# Patient Record
Sex: MALE | Race: WHITE | Employment: OTHER | URBAN - METROPOLITAN AREA
[De-identification: names, ages, dates, MRNs, and addresses within clinical notes are randomized per-mention and may not be internally consistent; named-entity substitution may affect disease eponyms.]

---

## 2017-01-06 PROBLEM — F32.89 DEPRESSIVE DISORDER, NOT ELSEWHERE CLASSIFIED: Status: ACTIVE | Noted: 2017-01-06

## 2017-04-05 ENCOUNTER — HOSPITAL ENCOUNTER (INPATIENT)
Facility: HOSPITAL | Age: 77
LOS: 10 days | Discharge: NURSING, REHAB OR LONG-TERM CARE | DRG: 372 | End: 2017-04-15
Attending: EMERGENCY MEDICINE | Admitting: INTERNAL MEDICINE
Payer: MEDICARE

## 2017-04-05 DIAGNOSIS — E86.0 DEHYDRATION: ICD-10-CM

## 2017-04-05 DIAGNOSIS — R19.7 DIARRHEA, UNSPECIFIED TYPE: ICD-10-CM

## 2017-04-05 DIAGNOSIS — N17.9 ACUTE RENAL FAILURE, UNSPECIFIED ACUTE RENAL FAILURE TYPE (HCC): Primary | ICD-10-CM

## 2017-04-05 DIAGNOSIS — I95.9 HYPOTENSION, UNSPECIFIED HYPOTENSION TYPE: ICD-10-CM

## 2017-04-05 DIAGNOSIS — A04.72 INTESTINAL INFECTION DUE TO CLOSTRIDIUM DIFFICILE: ICD-10-CM

## 2017-04-05 PROBLEM — R73.9 HYPERGLYCEMIA: Status: ACTIVE | Noted: 2017-04-05

## 2017-04-05 PROBLEM — D72.829 LEUKOCYTOSIS: Status: ACTIVE | Noted: 2017-04-05

## 2017-04-05 PROBLEM — Z91.81 AT RISK FOR FALLING: Status: ACTIVE | Noted: 2017-04-05

## 2017-04-05 PROBLEM — E87.2 METABOLIC ACIDOSIS: Status: ACTIVE | Noted: 2017-04-05

## 2017-04-05 PROCEDURE — 99223 1ST HOSP IP/OBS HIGH 75: CPT | Performed by: INTERNAL MEDICINE

## 2017-04-05 RX ORDER — GARLIC EXTRACT 500 MG
1 CAPSULE ORAL 2 TIMES DAILY
COMMUNITY
Start: 2017-04-02 | End: 2017-04-30

## 2017-04-05 RX ORDER — ASPIRIN 81 MG/1
81 TABLET ORAL DAILY
Status: DISCONTINUED | OUTPATIENT
Start: 2017-04-05 | End: 2017-04-15

## 2017-04-05 RX ORDER — METRONIDAZOLE 500 MG/1
500 TABLET ORAL 3 TIMES DAILY
Status: ON HOLD | COMMUNITY
Start: 2017-04-02 | End: 2017-04-15

## 2017-04-05 RX ORDER — ONDANSETRON 2 MG/ML
4 INJECTION INTRAMUSCULAR; INTRAVENOUS EVERY 4 HOURS PRN
Status: DISCONTINUED | OUTPATIENT
Start: 2017-04-05 | End: 2017-04-15

## 2017-04-05 RX ORDER — MULTIVIT-MIN/FA/LYCOPEN/LUTEIN .4-300-25
1 TABLET ORAL DAILY
COMMUNITY
End: 2018-01-15

## 2017-04-05 RX ORDER — MELATONIN
325
Status: DISCONTINUED | OUTPATIENT
Start: 2017-04-06 | End: 2017-04-15

## 2017-04-05 RX ORDER — MULTIPLE VITAMINS W/ MINERALS TAB 9MG-400MCG
1 TAB ORAL DAILY
Status: DISCONTINUED | OUTPATIENT
Start: 2017-04-06 | End: 2017-04-15

## 2017-04-05 RX ORDER — LEVETIRACETAM 500 MG/1
500 TABLET ORAL 2 TIMES DAILY
Status: DISCONTINUED | OUTPATIENT
Start: 2017-04-05 | End: 2017-04-15

## 2017-04-05 RX ORDER — FEBUXOSTAT 40 MG/1
40 TABLET, FILM COATED ORAL DAILY
Status: ON HOLD | COMMUNITY
End: 2018-01-08

## 2017-04-05 RX ORDER — GARLIC EXTRACT 500 MG
1 CAPSULE ORAL 2 TIMES DAILY
Status: DISCONTINUED | OUTPATIENT
Start: 2017-04-05 | End: 2017-04-15

## 2017-04-05 RX ORDER — MELATONIN
100 DAILY
Status: DISCONTINUED | OUTPATIENT
Start: 2017-04-06 | End: 2017-04-15

## 2017-04-05 RX ORDER — SODIUM CHLORIDE 9 MG/ML
INJECTION, SOLUTION INTRAVENOUS CONTINUOUS
Status: DISCONTINUED | OUTPATIENT
Start: 2017-04-05 | End: 2017-04-05

## 2017-04-05 RX ORDER — ESCITALOPRAM OXALATE 10 MG/1
10 TABLET ORAL EVERY MORNING
Status: DISCONTINUED | OUTPATIENT
Start: 2017-04-06 | End: 2017-04-15

## 2017-04-05 NOTE — ED PROVIDER NOTES
Patient Seen in: [facility] Emergency Department    History   Patient presents with:  Dehydration (metabolic/constitutional)  Hypotension (cardiovascular)    Stated Complaint: dehydration and hypotension    HPI    79-year-old male presents with diar Arthritis Sister          Smoking Status: Current Some Day Smoker         Packs/Day: 0.00  Years:           Types: Cigars    Comment: twice a month    Alcohol Use: No                Review of Systems    Positive for stated complaint: dehydration and hypote other components within normal limits   CBC W/ DIFFERENTIAL - Abnormal; Notable for the following:     WBC 29.1 (*)     HGB 12.1 (*)     HCT 36.5 (*)     RDW 16.1 (*)     RDW-SD 53.1 (*)     Neutrophil Absolute Prelim 24.20 (*)     All other components wit (ARF) ([facility] N17.9 4/5/2017 Yes    Acute renal failure ([facility] N17.9 4/5/2017 Unknown    At risk for falling Z91.81 4/5/2017 Unknown    Dehydration E86.0 Unknown Unknown    Diarrhea, unspecified type R19.7 4/5/2017 Unknown    Hyperglycemia R73.9 4/5/2017 Yes

## 2017-04-05 NOTE — CONSULTS
[facility]  Report of Consultation    Aletha Castorena Patient Status:  Emergency    1940 MRN [id number]   Location [address] [facility] Attending Tania Ramires MD   Hosp Day # 0 PCP No primary care provider on file.        Assess least 30-40 pounds over the last several months and does not have much of an appetite. He also claims that the diarrhea predates his recent C. difficile colitis.   He denies any history of acute or chronic renal failure gross microscopic hematuria proteinu Application topically 3 (three) times daily as needed. Review of Systems:  Please see HPI for pertinent positives. 10 point review of systems otherwise reviewed and negative.      Physical Exam:  BP 84/66 mmHg  Pulse 82  Temp(Src) 97.2 °F (36.2 °C) (T

## 2017-04-05 NOTE — ED INITIAL ASSESSMENT (HPI)
Per medics patient was sent here for diarrhea, hypotension, decreased po intake since Sunday. Patient is currently on isolation for c,diff.

## 2017-04-06 ENCOUNTER — APPOINTMENT (OUTPATIENT)
Dept: ULTRASOUND IMAGING | Facility: HOSPITAL | Age: 77
DRG: 372 | End: 2017-04-06
Attending: INTERNAL MEDICINE
Payer: MEDICARE

## 2017-04-06 PROCEDURE — 76770 US EXAM ABDO BACK WALL COMP: CPT

## 2017-04-06 PROCEDURE — 99233 SBSQ HOSP IP/OBS HIGH 50: CPT | Performed by: INTERNAL MEDICINE

## 2017-04-06 RX ORDER — METRONIDAZOLE 500 MG/100ML
500 INJECTION, SOLUTION INTRAVENOUS EVERY 8 HOURS
Status: DISCONTINUED | OUTPATIENT
Start: 2017-04-06 | End: 2017-04-14

## 2017-04-06 RX ORDER — HEPARIN SODIUM 5000 [USP'U]/ML
5000 INJECTION, SOLUTION INTRAVENOUS; SUBCUTANEOUS EVERY 12 HOURS
Status: DISCONTINUED | OUTPATIENT
Start: 2017-04-06 | End: 2017-04-15

## 2017-04-06 RX ORDER — SODIUM CHLORIDE 9 MG/ML
INJECTION, SOLUTION INTRAVENOUS ONCE
Status: COMPLETED | OUTPATIENT
Start: 2017-04-06 | End: 2017-04-06

## 2017-04-06 RX ORDER — POTASSIUM CHLORIDE 20 MEQ/1
40 TABLET, EXTENDED RELEASE ORAL ONCE
Status: COMPLETED | OUTPATIENT
Start: 2017-04-06 | End: 2017-04-06

## 2017-04-06 NOTE — PROGRESS NOTES
Pt AOx3, can be forgetful. SBP remaining in 90s, team aware, Asymptomatic, resting in bed. IVF infusing. Contact plus isolation for active C Diff, pt has had multiple loose BMs during shift, GI consulted for recent weight loss.  On IV flagyl and PO vanco fo

## 2017-04-06 NOTE — DIETARY MALNUTRITION NOTE
[address] [facility] ASSESSMENT    Pt is at moderate nutrition risk. Pt meets malnutrition criteria.     NUTRITION DIAGNOSIS/PROBLEM:    Malnutrition related to poor intake and ongoing diarrhea as evidenced by c.diff infection and wt loss of 75% intake of oral supplements  3. No signs of skin breakdown  4.  Maintain lean body mass    MEDICATIONS:  Noted    LABS:  Noted    FOLLOW-UP DATE: Follow Up Date: 04/11/17    SAUL Nice

## 2017-04-06 NOTE — PLAN OF CARE
NURSING ADMISSION NOTE      Patient admitted via Cart  Oriented to room. Safety precautions initiated. Bed in low position. Call light in reach.   Admission complete  Report given to RN

## 2017-04-06 NOTE — H&P
[facility]    PATIENT'S NAME: Charleen Gauthier   ATTENDING PHYSICIAN: Marlee Nunn M.D.    PATIENT ACCOUNT#:   [de-identified]    LOCATION:  [address] [place] [address]  MEDICAL RECORD #:   [id number]       YOB: 1940  ADMISSION DATE:       04/05/2017 S3.  ABDOMEN:  Bowel sounds present, soft, nontender. EXTREMITIES:  No cyanosis or icterus. NEUROLOGIC:  Generalized weakness. JOINTS:  No apparent acute fracture. SKIN:  Defer to nursing. LABORATORY DATA:  WBC 20.9, hemoglobin 9.7.   BUN 60, creatin

## 2017-04-06 NOTE — PLAN OF CARE
Dr Philip Roca paged , bolus of 500c of normal saline with result 97/60. Dr Roger Coto updated this am , patient has not voided , in no distress. unable to stand for standing weight.

## 2017-04-06 NOTE — PROGRESS NOTES
[facility]  Nephrology Progress Note    Minus Huh Attending:  Hank Law MD       Assessment and Plan:  1) GINNY- baseline Cr near \"normal\" with baseline creatinine 1-1.2 mg/dL; agree that acute kidney injury is due to severe dehydration fro non-tender. + bowel sounds, no palpable organomegaly  Extremities: Without clubbing, cyanosis; no edema  Neurologic: Cranial nerves grossly intact, moving all extremities  Skin: Warm and dry, no rashes       Labs:     Lab Results  Component Value Date   WB

## 2017-04-06 NOTE — PROGRESS NOTES
At 0126 bp noted to be 84/59 , rechecked frequently both arms. Dr Suellen Verduzco and Dr Lundy Sic paged , no call back yet, patient not symptomatic. No urine output since arrival to floor. Bladder scan noted to be 190cc, in no distress . Monitoring.

## 2017-04-06 NOTE — PLAN OF CARE
Dr Nakia Prieto called back,aware of BP. New order for 500cc bolus of normal saline.  Same initiated at 315am

## 2017-04-06 NOTE — PLAN OF CARE
Dr Star Gonsales was notified of admission, he will notify Dr Va Herman of patients admission.  No orders needed at this time

## 2017-04-06 NOTE — PHYSICAL THERAPY NOTE
PHYSICAL THERAPY EVALUATION - INPATIENT     Room Number: 417/417-A  Evaluation Date: 4/6/2017  Type of Evaluation: Initial  Physician Order: PT Eval and Treat    Presenting Problem: c-diff, dehydration, ARF  Reason for Therapy: Mobility Dysfunction and oriented to place, oriented to time, oriented to situation and oriented to person  · Following Commands:  follows one step commands with repetition  · Safety Judgement:  decreased awareness of need for assistance  · Awareness of Deficits:  decreased awaren bed, agreeable to PT eval. Pt completes supine to sit with mod I (HOB elevated). Scoots to EOB with min assist. Initial trial of sit to stand requires mod assist, pt fearful of falling.  Second trial of sit to stand up to r/w, pt requires min assist with cu education;Gait training;Strengthening;Transfer training;Balance training  Rehab Potential : Fair  Frequency (Obs): 5x/week  Number of Visits to Meet Established Goals: 5      CURRENT GOALS    Goal #1 Patient is able to demonstrate supine - sit EOB @ level:

## 2017-04-06 NOTE — CONSULTS
[facility]                       Gastroenterology [address] [facility] [address] Gastroenterology    Ronda Player Patient Status:  Inpatient    1940 MRN [id number]   [facility] 4NW-A Attending Alexa Steele MD   [facility] Day # 1 PCP No prim NaCl infusion  Intravenous Once   Heparin Sodium (Porcine) 5000 UNIT/ML injection 5,000 Units 5,000 Units Subcutaneous Q12H   vancomycin (FIRST-VANCOMYCIN 50) 50 MG/ML oral solution 125 mg 125 mg Oral Q6H   metRONIDAZOLE in NaCl (FLAGYL) 5 mg/ml IVPB premi The patient reports no easy bruising, frequent gum bleeding or nose bleeding;   The patient has no history of known chronic anemia            Dermatologic: The patient reports no recent rashes or chronic skin disorders            Rheumatologic: The patient CA 7.7 04/06/2017   ALB 3.5 04/05/2017   ALKPHO 72 04/05/2017   BILT 0.6 04/05/2017   AST 15 04/05/2017   ALT 15 04/05/2017   MG 1.5 04/06/2017   PHOS 3.2 04/06/2017     Recent Labs   Lab  04/05/17   1619  04/06/17   0607   GLU  150*  108*   BUN  68*  60 his POA are agreeable    Thank you for the consultation, we will follow the patient with you.   JORDON Ramirez  2:03 PM  4/6/2017  [facility] Gastroenterology  [phone]      Attending physician addendum:   I personally saw and examined the patient, a

## 2017-04-06 NOTE — CONSULTS
INFECTIOUS DISEASE CONSULT NOTE    Brianna Liu Patient Status:  Inpatient    1940 MRN [id number]   [facility] 4NW-A Attending Narcisa Baeza MD   Hosp Day # 1 PCP No primary ca mg, Oral, Q6H  •  metRONIDAZOLE in NaCl (FLAGYL) 5 mg/ml IVPB premix 500 mg, 500 mg, Intravenous, Q8H  •  ondansetron HCl (ZOFRAN) injection 4 mg, 4 mg, Intravenous, Q4H PRN  •  Acidophilus/Pectin (PROBIOTIC) CAPS 1 capsule, 1 capsule, Oral, BID  •  aspiri CO2  12.0*  17.0*   ALKPHO  72   --    AST  15   --    ALT  15*   --    BILT  0.6   --    TP  7.5   --      No results for input(s): Mejia Fan, SPECGRAVITY, GLUUR, [address], [address], [address] [facility], [address] [place] [address], [facility], [facility] [address], [address], [address] [address], [facility]

## 2017-04-06 NOTE — CM/SW NOTE
04/06/17 1100   CM/SW Referral Data   Referral Source Social Work (self-referral)   Reason for Referral Discharge planning;Psychoscial assessment   Informant Patient   Patient Info   Patient's Mental Status Alert;Oriented   Patient's Home Environment As

## 2017-04-07 PROCEDURE — 99233 SBSQ HOSP IP/OBS HIGH 50: CPT | Performed by: INTERNAL MEDICINE

## 2017-04-07 RX ORDER — SODIUM CHLORIDE 450 MG/100ML
INJECTION, SOLUTION INTRAVENOUS CONTINUOUS
Status: DISCONTINUED | OUTPATIENT
Start: 2017-04-07 | End: 2017-04-11

## 2017-04-07 NOTE — PROGRESS NOTES
[facility]  Progress Note    Charlie Nikolas Patient Status:  Inpatient    1940 MRN [id number]   [facility] 4NW-A Attending Tucker Bro MD   Hosp Day # 2 PCP No primary care provider on file.          SUBJECTIVE:  Subjective:  Aysha Nixon 15*   AST  15   ALB  3.5       No results for input(s): PGLU in the last 72 hours. No results for input(s): URINE, CULTI, BLDSMR in the last 72 hours.             Meds:     • potassium chloride 40mEq IVPB (peripheral line)  40 mEq Intravenous Once   • He ordered,  Available and radiology reviewed  All consultant notes reviewed  Discussed with nursing on floor  dvt prophylaxis reviewed  PT and/or OT  Suzanne Connor MD  4/7/2017  2:24 PM

## 2017-04-07 NOTE — CDS QUERY
Physician’s Documentation Request:      Documentation Clarification  By submitting this query, we are merely seeking further clarification of documentation. Please utilize your independent clinical judgment when addressing the question(s) below.     Dear D

## 2017-04-07 NOTE — PROGRESS NOTES
[facility]  Nephrology Progress Note    [address] Attending:  Tracy Lind MD       Assessment and Plan:  1) GINNY- baseline Cr near \"normal\" with baseline creatinine 1-1.2 mg/dL;  GINNY is due to severe dehydration from profuse diarrhea due to C Warm and dry, no rashes       Labs:     Lab Results  Component Value Date   WBC 19.3 04/07/2017   HGB 9.5 04/07/2017   HCT 28.0 04/07/2017   .0 04/07/2017   CREATSERUM 4.61 04/07/2017   BUN 55 04/07/2017    04/07/2017   K 2.8 04/07/2017   CL 1

## 2017-04-07 NOTE — CM/SW NOTE
SW spoke w/Lavinia from [facility] who stated they are able to accept the pt. Pt not ready for dc today.

## 2017-04-07 NOTE — PLAN OF CARE
GASTROINTESTINAL - ADULT    • Maintains or returns to baseline bowel function Not Progressing        Impaired Functional Mobility    • Achieve highest/safest level of mobility/gait Not Progressing        Patient/Family Goals    • Patient/Family Short Term

## 2017-04-07 NOTE — OCCUPATIONAL THERAPY NOTE
OCCUPATIONAL THERAPY EVALUATION - INPATIENT     Room Number: 417/417-A  Evaluation Date: 4/7/2017  Type of Evaluation: Initial       Physician Order: IP Consult to Occupational Therapy  Reason for Therapy: ADL/IADL Dysfunction and Discharge Planning    His Impaired  Arousal/Alertness:  appropriate responses to stimuli  Attention Span:  appears intact  Orientation Level:  oriented to situation and oriented to person  Following Commands:  follows one step commands with repetition  Initiation: cues to initiate A Lot  -   Taking care of personal grooming such as brushing teeth?: A Little  -   Eating meals?: A Little    AM-PAC Score:  Score: 11  Approx Degree of Impairment: 70.42%  Standardized Score (AM-PAC Scale): 29.04  CMS Modifier (G-Code): CL    FUNCTIONAL T Potential : Fair  Frequency (Obs): 5x/week  Number of Visits to Meet Established Goals: 7    ADL Goals  Patient will perform grooming: with min assist and while standing at sink  Patient will perform lower body dressing:  with mod assist and with adaptive

## 2017-04-07 NOTE — PROGRESS NOTES
Gastroenterology Progress Note  Patient Name: Sameera Harrison  Chief Complaint: C diff, diarrhea  S: Pt reports that he had 2 BMs yesterday. He denies abdominal pain.    O: BP 98/56 mmHg  Pulse 73  Temp(Src) 98.4 °F (36.9 °C) (Oral)  Resp 18  Ht 5' 5\" (1.65 Acute renal failure, unspecified acute renal failure type ([facility]     Intestinal infection due to Clostridium difficile     Hypotension, unspecified hypotension type     Diarrhea, unspecified type     At risk for falling     Diarrhea     CKD (chronic kidney

## 2017-04-07 NOTE — PROGRESS NOTES
[address] [place] [address]  TEL: [phone]  FAX: [phone]    Claudia Cardona Patient Status:  Inpatient    1940 MRN [id number]   [facility] 4NW-A Attending Yrn Carter MD   Hosp Day # 2 PCP No primary care p Radiology: Us Kidney/bladder (cpt=76770)    4/6/2017  PROCEDURE:  US KIDNEY/BLADDER (CPT=76770)  COMPARISON:  None. INDICATIONS:  minesh  TECHNIQUE:  Transabdominal gray scale ultrasound imaging of the bilateral kidneys and bladder was performed.   Routin

## 2017-04-07 NOTE — DIETARY NOTE
Nutrition Short Note    Received consult for calorie count. Envelope hung on door-calorie count to start at dinner tonight and continue through lunch on 4/10. RD to record calorie count results daily. Pt receiving Ensure High Protein BID.  Full assessment p

## 2017-04-07 NOTE — PHYSICAL THERAPY NOTE
PHYSICAL THERAPY TREATMENT NOTE - INPATIENT    Room Number: 417/417-A     Session: 1/5   Number of Visits to Meet Established Goals: 5    Presenting Problem: c-diff, dehydration, ARF    Problem List  Principal Problem:    Acute renal failure, unspecified a wheelchair)?: A Lot   -   Need to walk in hospital room?: Total   -   Climbing 3-5 steps with a railing?: Total       AM-PAC Score:  Raw Score: 9   PT Approx Degree of Impairment Score: 81.38%   Standardized Score (AM-PAC Scale): 30.55   CMS Modifier (G- independent       Goal #3  Patient is able to ambulate 50 feet with assist device: walker - rolling at assistance level: supervision       Goal #4      Goal #5      Goal #6      Goal Comments: Goals established on 4/6/2017- all goals in progress 4/7/17

## 2017-04-08 PROCEDURE — 99232 SBSQ HOSP IP/OBS MODERATE 35: CPT | Performed by: INTERNAL MEDICINE

## 2017-04-08 RX ORDER — ARIPIPRAZOLE 15 MG/1
40 TABLET ORAL EVERY 4 HOURS
Status: COMPLETED | OUTPATIENT
Start: 2017-04-08 | End: 2017-04-08

## 2017-04-08 NOTE — PROGRESS NOTES
[facility]  Nephrology Progress Note    [address] Attending:  Franchesca Claudio MD       Has ongoing diarrhea  Feels weak  Poor appetite  No f/c      Current Facility-Administered Medications:  vancomycin (FIRST-VANCOMYCIN 50) 50 MG/ML oral solutio 04/05/17  1619 04/06/17  0607 04/07/17  0549 04/08/17  0551   WBC 29.1* 20.9* 19.3* 19.4*   HGB 12.1* 9.7* 9.5* 9.2*   MCV 90.6 89.8 87.0 87.5   .0 239.0 248.0 220.0   BAND  --   --  1 5         Recent Labs   04/05/17 1619 04/06/17  0607 04/07/17

## 2017-04-08 NOTE — DIETARY NOTE
Nutrition Short Note    Received consult for calorie count. Envelope hung on door-calorie count to continue through lunch on 4/10. RD to record calorie count results daily.  Pt receiving Ensure High Protein BID.     4/7:   Lunch:  283 calories,  2.5 grams p

## 2017-04-08 NOTE — PROGRESS NOTES
Gastroenterology Progress Note  Patient Name: Lucy Thomason  Chief Complaint: C diff, diarrhea  S: Pt reports that he had 3 BMs yesterday. He denies abdominal pain.    O: /62 mmHg  Pulse 83  Temp(Src) 98.4 °F (36.9 °C) (Oral)  Resp 20  Ht 5' 5\" (1.6 failure (HCC)     Dehydration     Hypotension     Acute renal failure, unspecified acute renal failure type ([facility]     Intestinal infection due to Clostridium difficile     Hypotension, unspecified hypotension type     Diarrhea, unspecified type     At risk Gastroenterology  [phone]

## 2017-04-08 NOTE — PROGRESS NOTES
[facility]  Progress Note    Clint Gaytan Patient Status:  Inpatient    1940 MRN [id number]   [facility] 4NW-A Attending Tracy Lind MD   Hosp Day # 3 PCP No primary care provider on file.          SUBJECTIVE:  Subjective:  Josué Mayo 1.9  1.7   PHOS   --   3.2  1.7*   --    GLU  150*  108*  137*  103*       Recent Labs   Lab  04/05/17   1619   ALT  15*   AST  15   ALB  3.5       No results for input(s): PGLU in the last 72 hours.     No results for input(s): URINE, CULTI, BLDSMR in the .     See tests ordered,  Available and radiology reviewed  All consultant notes reviewed  Discussed with nursing on floor  dvt prophylaxis reviewed  PT and/or OT  Mikala James MD

## 2017-04-08 NOTE — PLAN OF CARE
A&Ox4. VSS. No c/o pain. No nausea or vomiting. Poor appetite, calorie count continued. Did not want to sit up in chair. IVF infusing. IV abx per mar. K/Mg replaced per nephrology. Resting in bed. Will continue to monitor.

## 2017-04-08 NOTE — PROGRESS NOTES
[address] [facility]  TEL: [phone]  FAX: [phone]    Dljoao Noel Patient Status:  Inpatient    1940 MRN [id number]   [facility] 4NW-A Attending Tomas Luna MD   Hosp Day # 3 PCP No primary care p ALT  15*   --    --    --    BILT  0.6   --    --    --    TP  7.5   --    --    --        Microbiology    Reviewed in EMR,     Radiology: Us Kidney/bladder (cpt=76770)    4/6/2017  PROCEDURE:  US KIDNEY/BLADDER (CPT=76770)  COMPARISON:  None.   INDICATIO

## 2017-04-09 PROCEDURE — 99232 SBSQ HOSP IP/OBS MODERATE 35: CPT | Performed by: INTERNAL MEDICINE

## 2017-04-09 NOTE — DIETARY NOTE
Nutrition Short Note    Received consult for calorie count. Envelope hung on door-calorie count to continue through lunch on 4/10. RD to record calorie count results daily. Pt receiving Ensure High Protein BID.      Date 4/9:               Breakfast: 480 ca

## 2017-04-09 NOTE — PROGRESS NOTES
[facility]  Nephrology Progress Note    [address] Attending:  Yu Trent MD       Better today  Feels stronger  Denies n/v  Still has ongoing diarrhea - 3-5 stools overnight       Current Facility-Administered Medications:  vancomycin (FIRST yoana       Recent Labs   04/07/17  0549 04/08/17  0551 04/09/17  0532   WBC 19.3* 19.4* 18.0*   HGB 9.5* 9.2* 9.2*   MCV 87.0 87.5 89.9   .0 220.0 207.0   BAND 1 5 5         Recent Labs   04/07/17  0549 04/08/17  0551 04/09/17  0532    143 1

## 2017-04-09 NOTE — PLAN OF CARE
GASTROINTESTINAL - ADULT    • Minimal or absence of nausea and vomiting Progressing          METABOLIC/FLUID AND ELECTROLYTES - ADULT    • Glucose maintained within prescribed range Progressing          SKIN/TISSUE INTEGRITY - ADULT    • Skin integrity rem

## 2017-04-09 NOTE — PLAN OF CARE
GASTROINTESTINAL - ADULT    • Maintains or returns to baseline bowel function Not Progressing    • Maintains adequate nutritional intake (undernourished) Not Progressing          RISK FOR INFECTION - ADULT    • Absence of fever/infection during anticipated

## 2017-04-09 NOTE — PROGRESS NOTES
[facility]    Progress Note    Haseeb Bailey Patient Status:  Inpatient    1940 MRN [id number]   [facility] 4NW-A Attending David Nixon MD   Hosp Day # 4 PCP No primary care provider on file.        SUBJECTIVE:    Complaining Acidophilus/Pectin (PROBIOTIC) CAPS 1 capsule 1 capsule Oral BID   aspirin EC tab 81 mg 81 mg Oral Daily   escitalopram (LEXAPRO) tablet 10 mg 10 mg Oral QAM   ferrous sulfate EC tab 325 mg 325 mg Oral Daily with breakfast   levETIRAcetam (KEPPRA) tab 50

## 2017-04-09 NOTE — PROGRESS NOTES
Gastroenterology Progress Note  Patient Name: Tiffanie Gabriel  Chief Complaint: diarrhea  S: Mr Isabel Pearson remains frustrated with management of his diarrhea, wants to know how we'll approach his uncontrolled diarrhea.   Currently his Cr and lytes are all impro suggestive of dehydration possibly related to diarrhea and poor intake. The patient denies nausea however reports a poor appetite; nursing home nurse reports that at best the patient consumes 50% of his meals with strong encouragement.   He also suffers fro

## 2017-04-10 ENCOUNTER — ANESTHESIA EVENT (OUTPATIENT)
Dept: ENDOSCOPY | Facility: HOSPITAL | Age: 77
DRG: 372 | End: 2017-04-10
Payer: MEDICARE

## 2017-04-10 PROCEDURE — 99232 SBSQ HOSP IP/OBS MODERATE 35: CPT | Performed by: INTERNAL MEDICINE

## 2017-04-10 NOTE — ANESTHESIA PREPROCEDURE EVALUATION
PRE-OP EVALUATION    Patient Name: Lesley Carrion    Pre-op Diagnosis: DIARRHEA    Procedure(s):  ESOPHAGOGASTRODUODENOSCOPY/FLEXIBLE SIGMOIDOSCOPY      Surgeon(s) and Role:     * Sebastian Hines, DO - Primary    Pre-op vitals reviewed.   Temp: 97.6 °F (36 Acidophilus/Pectin (PROBIOTIC) CAPS 1 capsule 1 capsule Oral BID   aspirin EC tab 81 mg 81 mg Oral Daily   escitalopram (LEXAPRO) tablet 10 mg 10 mg Oral QAM   ferrous sulfate EC tab 325 mg 325 mg Oral Daily with breakfast   levETIRAcetam (KEPPRA) tab 50 reviewed. Exercise tolerance: poor     MET: <=4                                           Endo/Other               (+) anemia                   Pulmonary    Negative pulmonary ROS.                        Neuro/Psych  Comment: Dementia    (+) depression of general anesthesia. The backup plan for safe patient care may include change of plan to full general anesthesia with potential airway placement.   Patient (legal guardian) demonstrated understanding, accepts risks and benefits, and wishes to proceed as

## 2017-04-10 NOTE — PLAN OF CARE
SKIN/TISSUE INTEGRITY - ADULT    • Skin integrity remains intact Not Progressing    Patients qian area is red and excoriated, barrier cream applied and pt encouraged to call staff with each episode of incontinence. Pt able to verbalize understanding.

## 2017-04-10 NOTE — DIETARY NOTE
Nutrition Short Note    Calorie count completed. Not all menus saved by staff. Pt po intake not meeting pt nutrition needs, but pt po intake did improve daily. Would expect continued increase in po intake with improvement in medical status.  Ensure High Pro

## 2017-04-10 NOTE — PHYSICAL THERAPY NOTE
PHYSICAL THERAPY TREATMENT NOTE - INPATIENT    Room Number: 417/417-A     Session: 2   Number of Visits to Meet Established Goals: 5    Presenting Problem: c-diff, dehydration, ARF    Problem List  Principal Problem:    Acute renal failure, unspecified ac Little   -   Need to walk in hospital room?: A Little   -   Climbing 3-5 steps with a railing?: A Lot       AM-PAC Score:  Raw Score: 17   PT Approx Degree of Impairment Score: 50.57%   Standardized Score (AM-PAC Scale): 42.13   CMS Modifier (G-Code): CK EOB @ level: independent         Goal #2   Patient is able to demonstrate transfers Sit to/from Stand at assistance level: modified independent         Goal #3   Patient is able to ambulate 50 feet with assist device: walker - rolling at assistance level:

## 2017-04-10 NOTE — PROGRESS NOTES
Gastroenterology Progress Note  Patient Name: Claudia Cardona  Chief Complaint: diarrhea  S:  Continues to have diarrhea  O: /75 mmHg  Pulse 71  Temp(Src) 97.6 °F (36.4 °C) (Oral)  Resp 20  Ht 5' 5\" (1.651 m)  Wt 169 lb 1.6 oz (76.703 kg)  BMI 28.14

## 2017-04-10 NOTE — PROGRESS NOTES
[address] [facility]  TEL: [phone]  FAX: [phone]    Lucy Thomason Patient Status:  Inpatient    1940 MRN [id number]   [facility] 4NW-A Attending Tiffany Ortiz MD   Hosp Day # 5 PCP No primary care p FINDINGS:   RIGHT KIDNEY MEASUREMENTS:  10.2 x 4.9 x 4.1 cm ECHOGENICITY:  Normal. HYDRONEPHROSIS:  None. CYSTS/STONES/MASSES:  None. LEFT KIDNEY MEASUREMENTS:  10 x 6.1 x 4.5 cm ECHOGENICITY:  Normal. HYDRONEPHROSIS:  None.  CYSTS/STONES/MASSES:  There i

## 2017-04-10 NOTE — PROGRESS NOTES
[facility]  Nephrology Progress Note    [address] Attending:  Tiffany Ortiz MD       Assessment and Plan:  1) GINNY- baseline Cr near \"normal\" with baseline creatinine 1-1.2 mg/dL;  GINNY is due to severe dehydration from profuse diarrhea due to C Lab Results  Component Value Date   CREATSERUM 1.94 04/10/2017   BUN 39 04/10/2017    04/10/2017   K 4.4 04/10/2017    04/10/2017   CO2 20.0 04/10/2017   GLU 98 04/10/2017   CA 8.5 04/10/2017   MG 1.8 04/10/2017       Imaging:   All imaging

## 2017-04-10 NOTE — PROGRESS NOTES
[facility]  Progress Note    Louie Aguilar Patient Status:  Inpatient    1940 MRN [id number]   [facility] 4NW-A Attending Kika Payne MD   Hosp Day # 5 PCP No primary care provider on file.          SUBJECTIVE:  Subjective:  Gonzales Severino TPROT    No results for input(s): PGLU in the last 72 hours. No results for input(s): URINE, CULTI, BLDSMR in the last 72 hours.             Meds:     • vancomycin  250 mg Oral Q6H   • Heparin Sodium (Porcine)  5,000 Units Subcutaneous Q12H   • metRONIDA consultant notes reviewed  Discussed with nursing on floor  dvt prophylaxis reviewed  PT and/or OT  Jolene Darden MD

## 2017-04-10 NOTE — PROGRESS NOTES
Pt AOx4, [place]. Resting in bed most of shift. Ambulated to chair with physical therapy. Continues to have frequent loose BMs due to C Diff infection, PO vanco and IV flagyl administered.  On CLD from this afternoon, NPO after midnight for scope tomorrow, cons

## 2017-04-11 ENCOUNTER — SURGERY (OUTPATIENT)
Age: 77
End: 2017-04-11

## 2017-04-11 ENCOUNTER — ANESTHESIA (OUTPATIENT)
Dept: ENDOSCOPY | Facility: HOSPITAL | Age: 77
DRG: 372 | End: 2017-04-11
Payer: MEDICARE

## 2017-04-11 PROCEDURE — 0DBN8ZX EXCISION OF SIGMOID COLON, VIA NATURAL OR ARTIFICIAL OPENING ENDOSCOPIC, DIAGNOSTIC: ICD-10-PCS | Performed by: INTERNAL MEDICINE

## 2017-04-11 PROCEDURE — 0DB98ZX EXCISION OF DUODENUM, VIA NATURAL OR ARTIFICIAL OPENING ENDOSCOPIC, DIAGNOSTIC: ICD-10-PCS | Performed by: INTERNAL MEDICINE

## 2017-04-11 PROCEDURE — 99233 SBSQ HOSP IP/OBS HIGH 50: CPT | Performed by: INTERNAL MEDICINE

## 2017-04-11 RX ORDER — ZINC OXIDE
OINTMENT (GRAM) TOPICAL AS NEEDED
Status: DISCONTINUED | OUTPATIENT
Start: 2017-04-11 | End: 2017-04-15

## 2017-04-11 RX ORDER — METOCLOPRAMIDE HYDROCHLORIDE 5 MG/ML
10 INJECTION INTRAMUSCULAR; INTRAVENOUS AS NEEDED
Status: DISCONTINUED | OUTPATIENT
Start: 2017-04-11 | End: 2017-04-11 | Stop reason: HOSPADM

## 2017-04-11 RX ORDER — NALOXONE HYDROCHLORIDE 0.4 MG/ML
80 INJECTION, SOLUTION INTRAMUSCULAR; INTRAVENOUS; SUBCUTANEOUS AS NEEDED
Status: CANCELLED | OUTPATIENT
Start: 2017-04-11 | End: 2017-04-11

## 2017-04-11 RX ORDER — ONDANSETRON 2 MG/ML
4 INJECTION INTRAMUSCULAR; INTRAVENOUS AS NEEDED
Status: DISCONTINUED | OUTPATIENT
Start: 2017-04-11 | End: 2017-04-11 | Stop reason: HOSPADM

## 2017-04-11 RX ORDER — SODIUM CHLORIDE, SODIUM LACTATE, POTASSIUM CHLORIDE, CALCIUM CHLORIDE 600; 310; 30; 20 MG/100ML; MG/100ML; MG/100ML; MG/100ML
INJECTION, SOLUTION INTRAVENOUS CONTINUOUS
Status: DISCONTINUED | OUTPATIENT
Start: 2017-04-11 | End: 2017-04-12

## 2017-04-11 RX ORDER — NALOXONE HYDROCHLORIDE 0.4 MG/ML
80 INJECTION, SOLUTION INTRAMUSCULAR; INTRAVENOUS; SUBCUTANEOUS AS NEEDED
Status: DISCONTINUED | OUTPATIENT
Start: 2017-04-11 | End: 2017-04-11 | Stop reason: HOSPADM

## 2017-04-11 RX ORDER — HYDROMORPHONE HYDROCHLORIDE 1 MG/ML
0.4 INJECTION, SOLUTION INTRAMUSCULAR; INTRAVENOUS; SUBCUTANEOUS EVERY 5 MIN PRN
Status: CANCELLED | OUTPATIENT
Start: 2017-04-11 | End: 2017-04-11

## 2017-04-11 RX ORDER — HYDROMORPHONE HYDROCHLORIDE 1 MG/ML
0.4 INJECTION, SOLUTION INTRAMUSCULAR; INTRAVENOUS; SUBCUTANEOUS EVERY 5 MIN PRN
Status: DISCONTINUED | OUTPATIENT
Start: 2017-04-11 | End: 2017-04-11 | Stop reason: HOSPADM

## 2017-04-11 RX ORDER — METOCLOPRAMIDE HYDROCHLORIDE 5 MG/ML
10 INJECTION INTRAMUSCULAR; INTRAVENOUS AS NEEDED
Status: CANCELLED | OUTPATIENT
Start: 2017-04-11 | End: 2017-04-11

## 2017-04-11 RX ORDER — ONDANSETRON 2 MG/ML
4 INJECTION INTRAMUSCULAR; INTRAVENOUS AS NEEDED
Status: CANCELLED | OUTPATIENT
Start: 2017-04-11 | End: 2017-04-11

## 2017-04-11 RX ORDER — SODIUM CHLORIDE, SODIUM LACTATE, POTASSIUM CHLORIDE, CALCIUM CHLORIDE 600; 310; 30; 20 MG/100ML; MG/100ML; MG/100ML; MG/100ML
INJECTION, SOLUTION INTRAVENOUS CONTINUOUS
Status: CANCELLED | OUTPATIENT
Start: 2017-04-11

## 2017-04-11 NOTE — PROGRESS NOTES
Pt AOx4, VSS, No c/o pain. Still experiencing frequent loose BMs--EGD with flex sig scheduled this afternoon. Given tap water enema one hour prior to scope. Sensicare applied to bottom for excoriation from incontinence.  IVF changed to bicarb drip per renal

## 2017-04-11 NOTE — ANESTHESIA POSTPROCEDURE EVALUATION
[address] [facility]664 Patient Status:  Inpatient   Age/Gender 65year old male MRN [id number]   Location [facility]. Attending Kika Payne MD   Hosp Day # 6 PCP No primary care provider on file.        Anesthesia Post-op Note

## 2017-04-11 NOTE — OPERATIVE REPORT
FLEXIBLE SIGMOIDOSCOPY WITH BIOPSY       Charlie Nikolas Patient Status:  Inpatient    1940 MRN [id number]   [facility] ENDOSCOPY Attending Tucker Bro MD   Hosp Day # 6 PCP No primary care provider on file.        Armaan Cabrera Continue treatment of c dif. If ineffective, can consider Dificid. Will defer to CECI Cruz  Gastroenterology/Advanced Endoscopy  [facility]

## 2017-04-11 NOTE — H&P (VIEW-ONLY)
[facility]                       Gastroenterology [address] [facility] Gastroenterology    Sameera Harrison Patient Status:  Inpatient    1940 MRN [id number]   [facility] 4NW-A Attending Nahid Luther MD   Psychiatric Day # 1 PCP No prim NaCl infusion  Intravenous Once   Heparin Sodium (Porcine) 5000 UNIT/ML injection 5,000 Units 5,000 Units Subcutaneous Q12H   vancomycin (FIRST-VANCOMYCIN 50) 50 MG/ML oral solution 125 mg 125 mg Oral Q6H   metRONIDAZOLE in NaCl (FLAGYL) 5 mg/ml IVPB premi The patient reports no easy bruising, frequent gum bleeding or nose bleeding;   The patient has no history of known chronic anemia            Dermatologic: The patient reports no recent rashes or chronic skin disorders            Rheumatologic: The patient CA 7.7 04/06/2017   ALB 3.5 04/05/2017   ALKPHO 72 04/05/2017   BILT 0.6 04/05/2017   AST 15 04/05/2017   ALT 15 04/05/2017   MG 1.5 04/06/2017   PHOS 3.2 04/06/2017     Recent Labs   Lab  04/05/17   1619  04/06/17   0607   GLU  150*  108*   BUN  68*  60 his POA are agreeable    Thank you for the consultation, we will follow the patient with you.   JORDON Jackson  2:03 PM  4/6/2017  [facility]  [phone]      Attending physician addendum:   I personally saw and examined the patient, a

## 2017-04-11 NOTE — INTERVAL H&P NOTE
Pre-op Diagnosis: DIARRHEA    The above referenced H&P was reviewed by Dino Mcpherson DO on 4/11/2017, the patient was examined and no significant changes have occurred in the patient's condition since the H&P was performed.   I discussed with the patient

## 2017-04-11 NOTE — INTERVAL H&P NOTE
Pre-op Diagnosis: DIARRHEA    The above referenced H&P was reviewed by Zoila Gonzáles DO on 4/11/2017, the patient was examined and no significant changes have occurred in the patient's condition since the H&P was performed.   I discussed with the patient

## 2017-04-11 NOTE — PROGRESS NOTES
Alert,oriented. No complaint of pain,continues to have loose stools this shift. NPO observed for Scheduled EDG/Flex Sig. Consent signed. IV fluids ongoing. Contact isolation for C-Diff noted. Seizure and Fall precautions observed. Will continue to monitor.

## 2017-04-11 NOTE — PROGRESS NOTES
[address] [facility]  TEL: [phone]  FAX: [phone]    Chase Forrest Patient Status:  Inpatient    1940 MRN [id number]   [facility] 4NW-A Attending Francisco Villegas MD   Hosp Day # 6 PCP No primary care p RIGHT KIDNEY MEASUREMENTS:  10.2 x 4.9 x 4.1 cm ECHOGENICITY:  Normal. HYDRONEPHROSIS:  None. CYSTS/STONES/MASSES:  None. LEFT KIDNEY MEASUREMENTS:  10 x 6.1 x 4.5 cm ECHOGENICITY:  Normal. HYDRONEPHROSIS:  None.  CYSTS/STONES/MASSES:  There is a 6 x 8 mm

## 2017-04-11 NOTE — PROGRESS NOTES
[facility]  Progress Note    Yarelis Burleson Patient Status:  Inpatient    1940 MRN [id number]   [facility] 4NW-A Attending Jennifer Leung MD   Hosp Day # 6 PCP No primary care provider on file.          SUBJECTIVE:  Subjective:  Leopold Martens ALPHOS, TBIL, DBIL, TPROT    No results for input(s): PGLU in the last 72 hours. No results for input(s): URINE, CULTI, BLDSMR in the last 72 hours.             Meds:     • potassium phosphate IVPB  30 mmol Intravenous Once   • vancomycin  250 mg Oral Q6   Disposition:  Defer to .     See tests ordered,  Available and radiology reviewed  All consultant notes reviewed  Discussed with nursing on floor  dvt prophylaxis reviewed  PT and/or OT  Oli Robbins MD

## 2017-04-11 NOTE — OPERATIVE REPORT
EGD WITH BIOPSY      Harshandrés Harrison Patient Status:  Inpatient    1940 MRN [id number]   [facility] ENDOSCOPY Attending Nahid Luther MD   Hosp Day # 6 PCP No primary care provider on file.        PREOPERATIVE DIAGNOSI taken.     IMPRESSION:   1. Gastritis and duodenitis s/p random gastritis and duodenal biopsies. 2. Multiple inflammatory appearing gastric mucosal nodules s/p biopsies. RECOMMENDATIONS:    1. Follow up pathology results   2. Proceed with Flex sig.

## 2017-04-11 NOTE — PHYSICAL THERAPY NOTE
IP PT attempted. Per RN, pt not appropriate at this time. Will reattempt 4/12/17 as schedule permits.

## 2017-04-11 NOTE — DIETARY NOTE
[facility] ASSESSMENT    Pt is at moderate nutrition risk. Pt meets malnutrition criteria.     NUTRITION DIAGNOSIS/PROBLEM:    Malnutrition related to poor intake and ongoing diarrhea as evidenced by c.diff infection and wt loss Meeting Needs: No  Food Allergies: shellfish  Cultural/Ethnic/Islam Preferences Addresses: Yes    NUTRITION RELATED PHYSICAL FINDINGS:     1. Body Fat/Muscle Mass: 28.14     2.  Fluid Accumulation: none noted    NUTRITION PRESCRIPTION:  Calories: 1600-1

## 2017-04-11 NOTE — PROGRESS NOTES
[facility]  Nephrology Progress Note    [address] Attending:  Hank Law MD       Assessment and Plan:  1) GINNY- baseline Cr near \"normal\" with baseline creatinine 1-1.2 mg/dL;  GINNY is due to severe dehydration from profuse diarrhea due to C Value Date   WBC 14.3 04/11/2017   HGB 9.1 04/11/2017   HCT 27.9 04/11/2017   .0 04/11/2017   CREATSERUM 1.59 04/11/2017   BUN 30 04/11/2017    04/11/2017   K 4.0 04/11/2017    04/11/2017   CO2 17.0 04/11/2017   GLU 85 04/11/2017   CA 8.

## 2017-04-12 PROCEDURE — 99232 SBSQ HOSP IP/OBS MODERATE 35: CPT | Performed by: INTERNAL MEDICINE

## 2017-04-12 NOTE — PROGRESS NOTES
[facility]  Nephrology Progress Note    [address] Attending:  Curtis Kemp MD       Assessment and Plan:  1) GINNY- due to dehydration with diarrhea / anorexia- resolved; labs near baseline.  PLAN- HL IV and DC Barrera  2) Diarrhea- EGD with Earl Lou Medications:  sodium bicarbonate 75 mEq in dextrose 5 % 1,000 mL infusion 75 mEq Intravenous Continuous   Zinc Oxide (DESITIN) 40 % ointment  Topical PRN   lactated ringers infusion  Intravenous Continuous   vancomycin (FIRST-VANCOMYCIN 50) 50 MG/ML oral s

## 2017-04-12 NOTE — PLAN OF CARE
Assumed care of pt at 2330. Pt alert and oriented x3. VSS and afebrile. Denies pain. x2 BM overnight. Pt incontinent, changed and repositioned frequently through the night. Regine area and and scrotum excoriated. Cream applied. All needs attended to.  Juan Antonio

## 2017-04-12 NOTE — CM/SW NOTE
Per Dr. Marleni Hurd, pt is ok to discharge if ok with other consults. Fredrick Renee at [facility] [phone] stated that patient has been accepted and bed is available today. She is aware that patient is on contact isolation.      Paged Consults to find out antici

## 2017-04-12 NOTE — PLAN OF CARE
Order to DC street per renal.  Urology on consult. Nicole rounding and notified of order. Since street hasn't been in for 24hrs, keep until DC planned and then do void trial. Pt informed of plan.

## 2017-04-12 NOTE — PLAN OF CARE
GASTROINTESTINAL - ADULT    • Maintains or returns to baseline bowel function Not Progressing        + CDIFF. Contact plus isolation maintained. Medications given as ordered. Diarrhea. Tolerating diet. Fair appetite.  Continue IV flagyl at this time per

## 2017-04-12 NOTE — PROGRESS NOTES
[facility]  Progress Note    Ruth Minor Patient Status:  Inpatient    1940 MRN [id number]   [facility] 4NW-A Attending Deonte Forbes MD   Hosp Day # 7 PCP No primary care provider on file.          SUBJECTIVE:  Subjective:  Alma Shaikh ALPHOS, TBIL, DBIL, TPROT    No results for input(s): PGLU in the last 72 hours. No results for input(s): URINE, CULTI, BLDSMR in the last 72 hours.             Meds:     • magnesium sulfate  2 g Intravenous Once   • vancomycin  250 mg Oral Q6H   • Hepar tests ordered,  Available and radiology reviewed  All consultant notes reviewed  Discussed with nursing on floor  dvt prophylaxis reviewed  PT and/or OT  Dory Rodriguez MD

## 2017-04-12 NOTE — PROGRESS NOTES
Gastroenterology Progress Note  Patient Name: Ronda Player  Chief Complaint: diarrhea  S:  Continues to have diarrhea  O: /66 mmHg  Pulse 71  Temp(Src) 98.5 °F (36.9 °C) (Oral)  Resp 20  Ht 5' 5\" (1.651 m)  Wt 169 lb 1.6 oz (76.703 kg)  BMI 28.14

## 2017-04-12 NOTE — PROGRESS NOTES
[address] [facility]  TEL: [phone]  FAX: [phone]    [employer] Player Patient Status:  Inpatient    1940 MRN [id number]   [facility] 4NW-A Attending Alexa Steele MD   Hosp Day # 7 PCP No primary care weight loss. Await biopsies    2. GINNY- due to volume depletion- resolved    3. Marked leukocytosis- due to above, wbc now back to nl    PLAN:              -cont po vanco and IV flagyl, diarrhea seems to be slowing down and wbc back to nl.  Exam remains cindy

## 2017-04-12 NOTE — CONSULTS
[facility]  Report of Consultation    Charlie Connor Patient Status:  Inpatient    1940 MRN [id number]   [facility] 4NW-A Attending Tucker Bro MD   Hosp Day # 6 PCP No primary care provider on file.      Impression and Plan: Rfl:    Magnesium 200 MG Oral Tab Take 200 mg by mouth daily. Disp:  Rfl:    niacin 500 MG Oral Tab Take 500 mg by mouth daily with breakfast. Disp:  Rfl:    Thiamine HCl 100 MG Oral Tab Take 100 mg by mouth daily.  Disp:  Rfl:          Current Facility-A Social History Narrative       Review of Systems:  No SOB, angina, fevers, focal weakness, and as in HPI.     Physical Exam:  /64 mmHg  Pulse 85  Temp(Src) 97.7 °F (36.5 °C) (Oral)  Resp 18  Ht 5' 5\" (1.651 m)  Wt 169 lb 1.6 oz (76.703 kg)  BMI 2

## 2017-04-13 PROCEDURE — 99232 SBSQ HOSP IP/OBS MODERATE 35: CPT | Performed by: INTERNAL MEDICINE

## 2017-04-13 NOTE — PROGRESS NOTES
[facility]  Urology Progress Note    Ronda Player Patient Status:  Inpatient    1940 MRN [id number]   [facility] 4NW-A Attending Alexa Steele MD   Hosp Day # 8 PCP No primary care provider on file.      Subjective:  Jaime GERMAIN Cell

## 2017-04-13 NOTE — PLAN OF CARE
Pt alert and oriented x4. VSS and afebrile. Pt with 5 BM's overnight. Pt is incontinent of bowel. Pt changed every 2-3 hours for loose BM overnight. Scrotum excoriated, sensicare cream applied to area. Buttocks and sacrum red and painful to touch.   Fole

## 2017-04-13 NOTE — PROGRESS NOTES
[facility]  Nephrology Progress Note    [address] Attending:  Kika Payne MD       Assessment and Plan:  1) GINNY- due to dehydration with diarrhea / anorexia- resolved; labs near baseline. IV HL'ed;  Bruce dc'ed  2) Diarrhea- EGD with gastric n ALT 13 04/13/2017   MG 1.8 04/13/2017       Imaging: All imaging studies reviewed.     Meds:     Current Facility-Administered Medications:  Zinc Oxide (DESITIN) 40 % ointment  Topical PRN   vancomycin (FIRST-VANCOMYCIN 50) 50 MG/ML oral solution 250 mg

## 2017-04-13 NOTE — PLAN OF CARE
GASTROINTESTINAL - ADULT    • Minimal or absence of nausea and vomiting Progressing    • Maintains or returns to baseline bowel function Progressing    • Maintains adequate nutritional intake (undernourished) Progressing        Pt. Cont. To have diarrhea.

## 2017-04-13 NOTE — PROGRESS NOTES
[address] [place]  TEL: [phone]  FAX: [phone]    [employer] Player Patient Status:  Inpatient    1940 MRN [id number]   [facility] 4NW-A Attending Alexa Steele MD   Hosp Day # 8 PCP No primary care ALT   --    --   13*   BILT   --    --   0.4   TP   --    --   5.6*       Microbiology    Reviewed in EMR,     Radiology: reviewed    ASSESSMENT:    1. Clostridium difficile colitis- marked leukocytosis on admission but no fevers and abd exam benign.  Wbc

## 2017-04-14 PROCEDURE — 99232 SBSQ HOSP IP/OBS MODERATE 35: CPT | Performed by: INTERNAL MEDICINE

## 2017-04-14 RX ORDER — CHOLESTYRAMINE LIGHT 4 G/5.7G
4 POWDER, FOR SUSPENSION ORAL 2 TIMES DAILY
Status: DISCONTINUED | OUTPATIENT
Start: 2017-04-14 | End: 2017-04-15

## 2017-04-14 NOTE — OCCUPATIONAL THERAPY NOTE
OCCUPATIONAL THERAPY TREATMENT NOTE - INPATIENT     Room Number: 417/417-A  Session: 2   Number of Visits to Meet Established Goals: 7    Presenting Problem: acute renal failure     History related to current admission:   Admitted with C-diff , weight loss Impairment: 50.11%  Standardized Score (AM-PAC Scale): 37.26  CMS Modifier (G-Code): CK    FUNCTIONAL TRANSFER ASSESSMENT  Supine to Sit : Supervision  Sit to Stand: Minimum assistance    Skilled Therapy Provided: Pt was found in bed in a semi-supine posit meal preparation, laundry and cleaning. Subacute rehab is recommended for 12-14 days. After this period of rehabilitation patient should achieve supervision level in dressing, toileting and bathing, light meal prep and grooming tasks.       OT Dischar

## 2017-04-14 NOTE — PLAN OF CARE
Impaired Functional Mobility    • Achieve highest/safest level of mobility/gait Not Progressing          Impaired Functional Mobility    • Achieve highest/safest level of mobility/gait Not Progressing          SAFETY ADULT - FALL    • Free from fall injury

## 2017-04-14 NOTE — PLAN OF CARE
GASTROINTESTINAL - ADULT    • Minimal or absence of nausea and vomiting Progressing    • Maintains adequate nutritional intake (undernourished) Progressing        GENITOURINARY - ADULT    • Absence of urinary retention Progressing        SAFETY ADULT - FAL

## 2017-04-14 NOTE — PROGRESS NOTES
[facility]  Progress Note    Rasheeda Villanueva Patient Status:  Inpatient    1940 MRN [id number]   [facility] 4NW-A Attending Dwight Be MD   Hosp Day # 8 PCP No primary care provider on file.          SUBJECTIVE:  Subjective:  Judy Peters 2.2*       No results for input(s): PGLU in the last 72 hours. No results for input(s): URINE, CULTI, BLDSMR in the last 72 hours.             Meds:     • vancomycin  250 mg Oral Q6H   • Heparin Sodium (Porcine)  5,000 Units Subcutaneous Q12H   • metRONI notes reviewed  Discussed with nursing on floor  dvt prophylaxis reviewed  PT and/or OT  Fab Snyder md

## 2017-04-14 NOTE — PROGRESS NOTES
[facility]  Urology Progress Note    Alejandrina Castellanos Patient Status:  Inpatient    1940 MRN [id number]   [facility] 4NW-A Attending Amanda Canales MD   Hosp Day # 9 PCP No primary care provider on file.      Subjective:  Jaime GERMAIN Cell

## 2017-04-14 NOTE — PROGRESS NOTES
[address] [place] [address]  TEL: [phone]  FAX: [phone]    Mary Squires Patient Status:  Inpatient    1940 MRN [id number]   [facility] 4NW-A Attending Yu Trent MD   Hosp Day # 9 PCP No primary care now normal. He continues to complain of diarrhea but seems to be slowing down              -reports chronic diarrhea and weight loss. Await biopsies    2. GINNY- due to volume depletion- resolved    3.  Marked leukocytosis- due to above, wbc now back to nl

## 2017-04-14 NOTE — PROGRESS NOTES
[facility]  Nephrology Progress Note    [address] Attending:  Bernard Nguyen MD       Assessment and Plan:  1) GINNY- due to dehydration with diarrhea / anorexia- resolved; labs near baseline. IV HL'ed.  Voiding trial today per   2) Diarrhea- EGD Medications:  Zinc Oxide (DESITIN) 40 % ointment  Topical PRN   vancomycin (FIRST-VANCOMYCIN 50) 50 MG/ML oral solution 250 mg 250 mg Oral Q6H   Heparin Sodium (Porcine) 5000 UNIT/ML injection 5,000 Units 5,000 Units Subcutaneous Q12H   metRONIDAZOLE in Na

## 2017-04-14 NOTE — PHYSICAL THERAPY NOTE
PHYSICAL THERAPY TREATMENT NOTE - INPATIENT    Room Number: 417/417-A     Session: 3   Number of Visits to Meet Established Goals: 5    Presenting Problem: c-diff, dehydration, ARF    Problem List  Principal Problem:    Acute renal failure, unspecified ac to and from a bed to a chair (including a wheelchair)?: A Little   -   Need to walk in hospital room?: A Little   -   Climbing 3-5 steps with a railing?: A Lot       AM-PAC Score:  Raw Score: 17   PT Approx Degree of Impairment Score: 50.57%   Standardized Treatment Plan: Bed mobility; Endurance; Patient education;Gait training;Strengthening;Transfer training;Balance training  Rehab Potential : Fair  Frequency (Obs): 5x/week    CURRENT GOALS      Goal #1    Patient is able to demonstrate supine - sit EOB @ lev

## 2017-04-14 NOTE — DIETARY NOTE
[facility]    NUTRITION FOLLOW-UP ASSESSMENT    Pt is at moderate nutrition risk. Pt meets malnutrition criteria.     NUTRITION DIAGNOSIS/PROBLEM:    Malnutrition related to poor intake and ongoing diarrhea as evidenced by c.diff infection and wt loss Readings from Last 6 Encounters:  04/14/17 : 75.388 kg (166 lb 3.2 oz)  01/09/17 : 68.629 kg (151 lb 4.8 oz)  12/28/16 : 72.576 kg (160 lb)  12/23/15 : 77.111 kg (170 lb)      NUTRITION:  Diet: Low Fiber  Oral Supplements: Ensure HP BID    FOOD/NUTRITION R

## 2017-04-15 VITALS
BODY MASS INDEX: 27.88 KG/M2 | TEMPERATURE: 98 F | WEIGHT: 167.31 LBS | RESPIRATION RATE: 18 BRPM | HEART RATE: 79 BPM | OXYGEN SATURATION: 96 % | DIASTOLIC BLOOD PRESSURE: 68 MMHG | SYSTOLIC BLOOD PRESSURE: 111 MMHG | HEIGHT: 65 IN

## 2017-04-15 RX ORDER — VANCOMYCIN HYDROCHLORIDE 125 MG/1
125 CAPSULE ORAL 4 TIMES DAILY
Qty: 84 CAPSULE | Refills: 0 | Status: SHIPPED | OUTPATIENT
Start: 2017-04-15 | End: 2017-05-06

## 2017-04-15 NOTE — PROGRESS NOTES
[address] [place] [address]  TEL: [phone]  FAX: [phone]    Chase Lon Patient Status:  Inpatient    1940 MRN [id number]   [facility] 4NW-A Attending Francisco Villegas MD   Hosp Day # 10 PCP No primary car on another 3 weeks of PO vanco since he apparently had a very prolonged duration of symptoms.  Can bring dose down to 125 mg    -biopsy noted, no other obvious abnormalities   -cont questran while here but may not need to cont at [facility]              -avoid oth

## 2017-04-15 NOTE — PLAN OF CARE
GASTROINTESTINAL - ADULT    • Maintains or returns to baseline bowel function Progressing        GENITOURINARY - ADULT    • Absence of urinary retention Progressing        SKIN/TISSUE INTEGRITY - ADULT    • Skin integrity remains intact Progressing

## 2017-04-15 NOTE — PROGRESS NOTES
[address] [facility],#934 Patient Status:  Inpatient    1940 MRN [id number]   [facility] 4NW-A Attending Alexa Steele MD   Hosp Day # 10 PCP No primary care provider on file. Subjective:   Interval History: has no complain Hypokalemia    Hypernatremia    Prerenal azotemia       LOS: 10 days     PLAN:    Ok for [employer] home  F/u with Dr Candice Feldman in 3-4 weeks.

## 2017-04-15 NOTE — CM/SW NOTE
RN updated MSW that patient is ready for D/C. MSW spoke to [facility] from [place] is ready to take patient and has ISO room ready. MSW called dtr, she is agreeable to d/c, Rn spoke to patient who is agreeable as well.   [facility] ambulance set up for 5pm via BLS

## 2017-04-17 ENCOUNTER — SNF VISIT (OUTPATIENT)
Dept: INTERNAL MEDICINE CLINIC | Age: 77
End: 2017-04-17

## 2017-04-17 ENCOUNTER — NURSE ONLY (OUTPATIENT)
Dept: LAB | Age: 77
End: 2017-04-17
Attending: INTERNAL MEDICINE
Payer: MEDICARE

## 2017-04-17 VITALS
RESPIRATION RATE: 18 BRPM | HEART RATE: 75 BPM | SYSTOLIC BLOOD PRESSURE: 122 MMHG | OXYGEN SATURATION: 97 % | DIASTOLIC BLOOD PRESSURE: 70 MMHG | TEMPERATURE: 97 F

## 2017-04-17 DIAGNOSIS — A04.72 C. DIFFICILE COLITIS: Primary | ICD-10-CM

## 2017-04-17 DIAGNOSIS — D64.9 ANEMIA: Primary | ICD-10-CM

## 2017-04-17 DIAGNOSIS — N17.9 ACUTE RENAL FAILURE, UNSPECIFIED ACUTE RENAL FAILURE TYPE (HCC): ICD-10-CM

## 2017-04-17 DIAGNOSIS — M79.10 MUSCLE PAIN: ICD-10-CM

## 2017-04-17 DIAGNOSIS — M10.9 GOUT, UNSPECIFIED CAUSE, UNSPECIFIED CHRONICITY, UNSPECIFIED SITE: ICD-10-CM

## 2017-04-17 DIAGNOSIS — E55.9 VITAMIN D DEFICIENCY: ICD-10-CM

## 2017-04-17 DIAGNOSIS — F02.81 DEMENTIA DUE TO MEDICAL CONDITION WITH BEHAVIORAL DISTURBANCE (HCC): ICD-10-CM

## 2017-04-17 DIAGNOSIS — R35.1 NOCTURIA: ICD-10-CM

## 2017-04-17 DIAGNOSIS — N18.3 CKD (CHRONIC KIDNEY DISEASE), STAGE 3 (MODERATE): ICD-10-CM

## 2017-04-17 DIAGNOSIS — E83.42 HYPOMAGNESEMIA: ICD-10-CM

## 2017-04-17 DIAGNOSIS — F03.90 DEMENTIA WITHOUT BEHAVIORAL DISTURBANCE, UNSPECIFIED DEMENTIA TYPE (HCC): ICD-10-CM

## 2017-04-17 DIAGNOSIS — F33.9 RECURRENT MAJOR DEPRESSIVE DISORDER, REMISSION STATUS UNSPECIFIED (HCC): ICD-10-CM

## 2017-04-17 DIAGNOSIS — R53.81 PHYSICAL DECONDITIONING: ICD-10-CM

## 2017-04-17 DIAGNOSIS — D64.9 ANEMIA, UNSPECIFIED TYPE: ICD-10-CM

## 2017-04-17 DIAGNOSIS — N20.0 RENAL STONE: ICD-10-CM

## 2017-04-17 PROCEDURE — 85025 COMPLETE CBC W/AUTO DIFF WBC: CPT

## 2017-04-17 PROCEDURE — 80053 COMPREHEN METABOLIC PANEL: CPT

## 2017-04-17 PROCEDURE — 36415 COLL VENOUS BLD VENIPUNCTURE: CPT

## 2017-04-17 NOTE — PROGRESS NOTES
Brianna Alexa  : 1940  Age 65year old  male patient is admitted to Facility: [place] [address] for ALEXA after hospitalization for C Diff, dehydration and ARF.     [facility] Admit date:    17  Discharge date to [facility]:    4.15.17  E Take 81 mg by mouth daily. Disp:  Rfl:    psyllium 28 % Oral Powd Pack Take 1 packet by mouth daily. Disp:  Rfl:    febuxostat (ULORIC) 40 MG Oral Tab Take 40 mg by mouth daily.  Disp:  Rfl:    Multiple Vitamins-Minerals (CERTAVITE SENIOR/ANTIOXIDANT) Robi Pitts +seizure d/o, last seizure reported in 12.2016  PSYCHE: ---on tx for depression  HEMATOLOGY:denies hx anemia, denies bruising, denies excessive bleeding  ENDOCRINE: denies excessive thirst or urination; denies unexpected wt gain or wt loss  ALLERGY/IMM.: d Resolved. Continue to monitor UO. F/U w/ Dr Lee/urology in 3 wks. Non-obtructing left renal stone:  Monitor UO and onset of sxs. F/U w/ PCP after DC. Physical deconditioning:  PT/OT eval and tx. ELOS 12-14 days.   Hypomagnesemia:  CPM w/ Mg supplement [facility]    Hypomagnesemia   1. Magnesium 200 mg daily  2. Check Mg    B/L hamstring pain  1. Check Mg, Vitamin D 25 OH, TFTs, CK and ESR    Seizure d/o  1. Keppra 500 mg BID  2. Keppra level w/ next lab draw  3. Seizure precautions  4.  Needs neurologist

## 2017-04-18 ENCOUNTER — NURSE ONLY (OUTPATIENT)
Dept: LAB | Age: 77
End: 2017-04-18
Attending: INTERNAL MEDICINE
Payer: MEDICARE

## 2017-04-18 DIAGNOSIS — M62.81 MUSCLE WEAKNESS: Primary | ICD-10-CM

## 2017-04-18 PROCEDURE — 36415 COLL VENOUS BLD VENIPUNCTURE: CPT

## 2017-04-18 PROCEDURE — 82550 ASSAY OF CK (CPK): CPT

## 2017-04-18 PROCEDURE — 84443 ASSAY THYROID STIM HORMONE: CPT

## 2017-04-18 PROCEDURE — 83735 ASSAY OF MAGNESIUM: CPT

## 2017-04-18 PROCEDURE — 85652 RBC SED RATE AUTOMATED: CPT

## 2017-04-18 PROCEDURE — 84436 ASSAY OF TOTAL THYROXINE: CPT

## 2017-04-18 PROCEDURE — 82306 VITAMIN D 25 HYDROXY: CPT

## 2017-04-19 NOTE — DISCHARGE SUMMARY
[facility]  Discharge Summary    Ami Hidalgo Patient Status:  Inpatient    1940 MRN [id number]   [facility] 4NW-A Attending No att. providers found   [facility] Day # 10 PCP No primary care provider on file.      Date of Admission:  cooperative, no distress, appears stated age    Head:     Normocephalic,  atraumatic    Neck:    Supple, symmetrical, trachea midline,         thyroid:       no JVD    Lungs:      Clear to auscultation bilaterally, respirations unlabored          Heart:   difficile colitis.  IV hydration. , endoscopy per gi  3.      Acute renal failure secondary to dehydration secondary to Clostridium difficile colitis.  IV hydration and renal consult. 4.      Hypomagnesemia. - Replace magnesium.   5.      Weakness.  PT/OT t FE) MG Oral Tab EC  Take 325 mg by mouth daily with breakfast., Historical    Magnesium 200 MG Oral Tab  Take 200 mg by mouth daily.   , Historical    niacin 500 MG Oral Tab  Take 500 mg by mouth daily with breakfast., Historical    Thiamine HCl 100 MG Oral

## 2017-04-19 NOTE — PROGRESS NOTES
[facility]  Progress Note    Alethamirna Castorena Patient Status:  Inpatient    1940 MRN [id number]   [facility] 4NW-A Attending Candie Mancilla MD   Hosp Day # 10 PCP No primary care provider on file.          SUBJECTIVE:  Subjective:  [facility]    Dehydration    Intestinal infection due to Clostridium difficile    Hypotension, unspecified hypotension type    Diarrhea, unspecified type    At risk for falling    Diarrhea    CKD (chronic kidney disease)    Hypokalemia    Hypernatremia

## 2017-04-21 ENCOUNTER — NURSE ONLY (OUTPATIENT)
Dept: LAB | Age: 77
End: 2017-04-21
Attending: INTERNAL MEDICINE
Payer: MEDICARE

## 2017-04-21 ENCOUNTER — SNF VISIT (OUTPATIENT)
Dept: INTERNAL MEDICINE CLINIC | Age: 77
End: 2017-04-21

## 2017-04-21 VITALS — HEART RATE: 109 BPM | OXYGEN SATURATION: 98 %

## 2017-04-21 DIAGNOSIS — R69 DIAGNOSIS UNKNOWN: Primary | ICD-10-CM

## 2017-04-21 DIAGNOSIS — R53.81 PHYSICAL DECONDITIONING: ICD-10-CM

## 2017-04-21 DIAGNOSIS — E03.9 ACQUIRED HYPOTHYROIDISM: ICD-10-CM

## 2017-04-21 DIAGNOSIS — H61.23 BILATERAL IMPACTED CERUMEN: ICD-10-CM

## 2017-04-21 DIAGNOSIS — A04.72 C. DIFFICILE COLITIS: Primary | ICD-10-CM

## 2017-04-21 DIAGNOSIS — E83.42 HYPOMAGNESEMIA: ICD-10-CM

## 2017-04-21 PROCEDURE — 36415 COLL VENOUS BLD VENIPUNCTURE: CPT

## 2017-04-21 PROCEDURE — 99309 SBSQ NF CARE MODERATE MDM 30: CPT | Performed by: NURSE PRACTITIONER

## 2017-04-21 PROCEDURE — 84439 ASSAY OF FREE THYROXINE: CPT

## 2017-04-21 PROCEDURE — 83735 ASSAY OF MAGNESIUM: CPT

## 2017-04-21 RX ORDER — LEVOTHYROXINE SODIUM 0.03 MG/1
25 TABLET ORAL
COMMUNITY
End: 2018-09-16

## 2017-04-21 RX ORDER — MAGNESIUM OXIDE 400 MG (241.3 MG MAGNESIUM) TABLET
400 TABLET 2 TIMES DAILY
COMMUNITY
End: 2018-09-16

## 2017-04-21 NOTE — PROGRESS NOTES
Ivory Laguna, 35/1940, 65year old, male    Chief Complaint:  Patient presents with: Follow - Up: f/u C Diff  Weakness  Hearing Problem: \"Since they started the ear drops my hearing is worse. \"       Subjective:   PMH significant for seizure d/o, jennifer rebound tenderness.   :Deferred  LYMPHATIC:no lymphedema  MUSCULOSKELETAL: no acute synovitis upper or lower extremity;  EXTREMITIES/VASCULAR:no cyanosis, clubbing or edema, radial pulses 2+ and dorsalis pedal pulses 2+  NEUROLOGIC: intact; no sensorimoto    1. Magnesium 400 mg BID  2. Check Mg in one wk    B/L hamstring pain  1. Monitor    Seizure d/o  1. Keppra 500 mg BID  2. Keppra level w/ next lab draw  3. Seizure precautions  4. Needs neurologist    Dementia  1. Staff to reorient pt prn  2.  Consult ps

## 2017-04-24 ENCOUNTER — NURSE ONLY (OUTPATIENT)
Dept: LAB | Age: 77
End: 2017-04-24
Attending: INTERNAL MEDICINE
Payer: MEDICARE

## 2017-04-24 DIAGNOSIS — R56.9 SEIZURE (HCC): Primary | ICD-10-CM

## 2017-04-24 DIAGNOSIS — R55 SYNCOPE AND COLLAPSE: ICD-10-CM

## 2017-04-24 PROCEDURE — 80053 COMPREHEN METABOLIC PANEL: CPT

## 2017-04-24 PROCEDURE — 83735 ASSAY OF MAGNESIUM: CPT

## 2017-04-24 PROCEDURE — 80177 DRUG SCRN QUAN LEVETIRACETAM: CPT

## 2017-04-24 PROCEDURE — 36415 COLL VENOUS BLD VENIPUNCTURE: CPT

## 2017-04-24 PROCEDURE — 85025 COMPLETE CBC W/AUTO DIFF WBC: CPT

## 2017-04-26 ENCOUNTER — SNF DISCHARGE (OUTPATIENT)
Dept: INTERNAL MEDICINE CLINIC | Age: 77
End: 2017-04-26

## 2017-04-26 ENCOUNTER — NURSE ONLY (OUTPATIENT)
Dept: LAB | Age: 77
End: 2017-04-26
Attending: INTERNAL MEDICINE
Payer: MEDICARE

## 2017-04-26 VITALS
SYSTOLIC BLOOD PRESSURE: 122 MMHG | HEART RATE: 69 BPM | DIASTOLIC BLOOD PRESSURE: 71 MMHG | OXYGEN SATURATION: 96 % | RESPIRATION RATE: 18 BRPM

## 2017-04-26 DIAGNOSIS — D64.9 ANEMIA, UNSPECIFIED TYPE: ICD-10-CM

## 2017-04-26 DIAGNOSIS — N17.9 ACUTE RENAL FAILURE, UNSPECIFIED ACUTE RENAL FAILURE TYPE (HCC): ICD-10-CM

## 2017-04-26 DIAGNOSIS — F02.81 DEMENTIA DUE TO MEDICAL CONDITION WITH BEHAVIORAL DISTURBANCE (HCC): ICD-10-CM

## 2017-04-26 DIAGNOSIS — E55.9 VITAMIN D DEFICIENCY: ICD-10-CM

## 2017-04-26 DIAGNOSIS — E83.42 HYPOMAGNESEMIA: ICD-10-CM

## 2017-04-26 DIAGNOSIS — N18.3 CKD (CHRONIC KIDNEY DISEASE), STAGE 3 (MODERATE): ICD-10-CM

## 2017-04-26 DIAGNOSIS — R35.1 NOCTURIA: ICD-10-CM

## 2017-04-26 DIAGNOSIS — A04.72 C. DIFFICILE COLITIS: Primary | ICD-10-CM

## 2017-04-26 DIAGNOSIS — R69 DIAGNOSIS UNKNOWN: Primary | ICD-10-CM

## 2017-04-26 DIAGNOSIS — M10.9 GOUT, UNSPECIFIED CAUSE, UNSPECIFIED CHRONICITY, UNSPECIFIED SITE: ICD-10-CM

## 2017-04-26 DIAGNOSIS — R53.81 PHYSICAL DECONDITIONING: ICD-10-CM

## 2017-04-26 DIAGNOSIS — N20.0 RENAL STONE: ICD-10-CM

## 2017-04-26 DIAGNOSIS — F33.9 RECURRENT MAJOR DEPRESSIVE DISORDER, REMISSION STATUS UNSPECIFIED (HCC): ICD-10-CM

## 2017-04-26 DIAGNOSIS — H61.23 BILATERAL IMPACTED CERUMEN: ICD-10-CM

## 2017-04-26 DIAGNOSIS — E03.9 ACQUIRED HYPOTHYROIDISM: ICD-10-CM

## 2017-04-26 PROCEDURE — 80048 BASIC METABOLIC PNL TOTAL CA: CPT

## 2017-04-26 PROCEDURE — 99316 NF DSCHRG MGMT 30 MIN+: CPT | Performed by: NURSE PRACTITIONER

## 2017-04-26 PROCEDURE — 36415 COLL VENOUS BLD VENIPUNCTURE: CPT

## 2017-04-26 RX ORDER — CITALOPRAM 10 MG/1
10 TABLET ORAL DAILY
COMMUNITY

## 2017-04-27 NOTE — PROGRESS NOTES
Ruth Minor, 35/1940, 65year old, male is being discharged from Facility: [address]    Date of Admission:  4.15.17    Date of Discharge:  Anticipated on 4.28.17                                 Admitting Diagnoses: apparent distress; sleeping in bed; arouses easily to name, +contact isolation  LINES, TUBES, DRAINS:  none  SKIN: no rashes, no suspicious lesions, pale, warm, dry  WOUND: none  EYES: PERRLA, EOMI, sclera anicteric, conjunctiva normal; there is no nystagm 27.4* 04/24/2017   MCV 91.3 04/24/2017   MCH 29.7 04/24/2017   MCHC 32.5 04/24/2017   RDW 15.7 04/24/2017   .0 04/24/2017       Lab Results  Component Value Date   MG 1.6* 04/24/2017       Lab Results  Component Value Date   T4F 0.9 04/21/2017   TSH

## 2017-04-28 ENCOUNTER — NURSE ONLY (OUTPATIENT)
Dept: LAB | Age: 77
End: 2017-04-28
Attending: INTERNAL MEDICINE
Payer: MEDICARE

## 2017-04-28 DIAGNOSIS — R55 SYNCOPE: Primary | ICD-10-CM

## 2017-04-28 PROCEDURE — 36415 COLL VENOUS BLD VENIPUNCTURE: CPT

## 2017-04-28 PROCEDURE — 80048 BASIC METABOLIC PNL TOTAL CA: CPT

## 2017-05-01 ENCOUNTER — NURSE ONLY (OUTPATIENT)
Dept: LAB | Age: 77
End: 2017-05-01
Attending: INTERNAL MEDICINE
Payer: MEDICARE

## 2017-05-01 DIAGNOSIS — R69 ILLNESS: Primary | ICD-10-CM

## 2018-01-08 ENCOUNTER — SURGERY (OUTPATIENT)
Age: 78
End: 2018-01-08

## 2018-01-08 ENCOUNTER — ANESTHESIA (OUTPATIENT)
Dept: SURGERY | Facility: HOSPITAL | Age: 78
End: 2018-01-08

## 2018-01-08 ENCOUNTER — APPOINTMENT (OUTPATIENT)
Dept: CT IMAGING | Facility: HOSPITAL | Age: 78
DRG: 853 | End: 2018-01-08
Attending: EMERGENCY MEDICINE
Payer: MEDICARE

## 2018-01-08 ENCOUNTER — HOSPITAL ENCOUNTER (INPATIENT)
Facility: HOSPITAL | Age: 78
LOS: 7 days | Discharge: HOME HEALTH | DRG: 853 | End: 2018-01-15
Attending: EMERGENCY MEDICINE | Admitting: INTERNAL MEDICINE
Payer: MEDICARE

## 2018-01-08 ENCOUNTER — ANESTHESIA EVENT (OUTPATIENT)
Dept: SURGERY | Facility: HOSPITAL | Age: 78
End: 2018-01-08

## 2018-01-08 DIAGNOSIS — K56.609 SBO (SMALL BOWEL OBSTRUCTION) (HCC): ICD-10-CM

## 2018-01-08 DIAGNOSIS — N17.9 AKI (ACUTE KIDNEY INJURY) (HCC): ICD-10-CM

## 2018-01-08 DIAGNOSIS — R11.2 NON-INTRACTABLE VOMITING WITH NAUSEA, UNSPECIFIED VOMITING TYPE: Primary | ICD-10-CM

## 2018-01-08 DIAGNOSIS — K40.30 INCARCERATED INGUINAL HERNIA: ICD-10-CM

## 2018-01-08 DIAGNOSIS — E86.0 DEHYDRATION: ICD-10-CM

## 2018-01-08 DIAGNOSIS — I95.9 HYPOTENSION, UNSPECIFIED HYPOTENSION TYPE: ICD-10-CM

## 2018-01-08 PROBLEM — E87.1 HYPONATREMIA: Status: ACTIVE | Noted: 2018-01-08

## 2018-01-08 PROBLEM — E87.5 HYPERKALEMIA: Status: ACTIVE | Noted: 2018-01-08

## 2018-01-08 LAB
ALBUMIN SERPL-MCNC: 3.3 G/DL (ref 3.5–4.8)
ALP LIVER SERPL-CCNC: 51 U/L (ref 45–117)
ALT SERPL-CCNC: 15 U/L (ref 17–63)
AST SERPL-CCNC: 45 U/L (ref 15–41)
BASOPHILS # BLD AUTO: 0.07 X10(3) UL (ref 0–0.1)
BASOPHILS NFR BLD AUTO: 0.4 %
BILIRUB SERPL-MCNC: 1 MG/DL (ref 0.1–2)
BUN BLD-MCNC: 80 MG/DL (ref 8–20)
CALCIUM BLD-MCNC: 11.6 MG/DL (ref 8.3–10.3)
CHLORIDE: 99 MMOL/L (ref 101–111)
CO2: 19 MMOL/L (ref 22–32)
CREAT BLD-MCNC: 6.89 MG/DL (ref 0.7–1.3)
EOSINOPHIL # BLD AUTO: 0 X10(3) UL (ref 0–0.3)
EOSINOPHIL NFR BLD AUTO: 0 %
ERYTHROCYTE [DISTWIDTH] IN BLOOD BY AUTOMATED COUNT: 13.2 % (ref 11.5–16)
GLUCOSE BLD-MCNC: 130 MG/DL (ref 70–99)
HCT VFR BLD AUTO: 34.7 % (ref 37–53)
HGB BLD-MCNC: 11.9 G/DL (ref 13–17)
IMMATURE GRANULOCYTE COUNT: 0.32 X10(3) UL (ref 0–1)
IMMATURE GRANULOCYTE RATIO %: 1.7 %
LACTIC ACID: 1.9 MMOL/L (ref 0.5–2)
LACTIC ACID: 2.4 MMOL/L (ref 0.5–2)
LYMPHOCYTES # BLD AUTO: 2.42 X10(3) UL (ref 0.9–4)
LYMPHOCYTES NFR BLD AUTO: 13.1 %
M PROTEIN MFR SERPL ELPH: 8.7 G/DL (ref 6.1–8.3)
MCH RBC QN AUTO: 32.2 PG (ref 27–33.2)
MCHC RBC AUTO-ENTMCNC: 34.3 G/DL (ref 31–37)
MCV RBC AUTO: 93.8 FL (ref 80–99)
MONOCYTES # BLD AUTO: 2.35 X10(3) UL (ref 0.1–0.6)
MONOCYTES NFR BLD AUTO: 12.8 %
NEUTROPHIL ABS PRELIM: 13.25 X10 (3) UL (ref 1.3–6.7)
NEUTROPHILS # BLD AUTO: 13.25 X10(3) UL (ref 1.3–6.7)
NEUTROPHILS NFR BLD AUTO: 72 %
PLATELET # BLD AUTO: 263 10(3)UL (ref 150–450)
POTASSIUM SERPL-SCNC: 5.2 MMOL/L (ref 3.6–5.1)
RBC # BLD AUTO: 3.7 X10(6)UL (ref 3.8–5.8)
RED CELL DISTRIBUTION WIDTH-SD: 45.1 FL (ref 35.1–46.3)
SODIUM SERPL-SCNC: 133 MMOL/L (ref 136–144)
WBC # BLD AUTO: 18.4 X10(3) UL (ref 4–13)

## 2018-01-08 PROCEDURE — 0YU54JZ SUPPLEMENT RIGHT INGUINAL REGION WITH SYNTHETIC SUBSTITUTE, PERCUTANEOUS ENDOSCOPIC APPROACH: ICD-10-PCS | Performed by: SURGERY

## 2018-01-08 PROCEDURE — 99222 1ST HOSP IP/OBS MODERATE 55: CPT | Performed by: INTERNAL MEDICINE

## 2018-01-08 PROCEDURE — 99223 1ST HOSP IP/OBS HIGH 75: CPT | Performed by: INTERNAL MEDICINE

## 2018-01-08 PROCEDURE — 74176 CT ABD & PELVIS W/O CONTRAST: CPT | Performed by: EMERGENCY MEDICINE

## 2018-01-08 PROCEDURE — 05H533Z INSERTION OF INFUSION DEVICE INTO RIGHT SUBCLAVIAN VEIN, PERCUTANEOUS APPROACH: ICD-10-PCS | Performed by: HOSPITALIST

## 2018-01-08 DEVICE — BARD MESH
Type: IMPLANTABLE DEVICE | Site: GROIN | Status: FUNCTIONAL
Brand: BARD MESH

## 2018-01-08 RX ORDER — ONDANSETRON 2 MG/ML
4 INJECTION INTRAMUSCULAR; INTRAVENOUS EVERY 6 HOURS PRN
Status: DISCONTINUED | OUTPATIENT
Start: 2018-01-08 | End: 2018-01-08

## 2018-01-08 RX ORDER — POLYETHYLENE GLYCOL 3350 17 G/17G
17 POWDER, FOR SOLUTION ORAL DAILY PRN
Status: DISCONTINUED | OUTPATIENT
Start: 2018-01-08 | End: 2018-01-15

## 2018-01-08 RX ORDER — DOCUSATE SODIUM 100 MG/1
100 CAPSULE, LIQUID FILLED ORAL DAILY
COMMUNITY
End: 2018-01-15

## 2018-01-08 RX ORDER — HYDROMORPHONE HYDROCHLORIDE 1 MG/ML
0.5 INJECTION, SOLUTION INTRAMUSCULAR; INTRAVENOUS; SUBCUTANEOUS EVERY 2 HOUR PRN
Status: DISCONTINUED | OUTPATIENT
Start: 2018-01-08 | End: 2018-01-10

## 2018-01-08 RX ORDER — HYDROMORPHONE HYDROCHLORIDE 1 MG/ML
0.2 INJECTION, SOLUTION INTRAMUSCULAR; INTRAVENOUS; SUBCUTANEOUS EVERY 2 HOUR PRN
Status: DISCONTINUED | OUTPATIENT
Start: 2018-01-08 | End: 2018-01-15

## 2018-01-08 RX ORDER — HYDROMORPHONE HYDROCHLORIDE 1 MG/ML
0.4 INJECTION, SOLUTION INTRAMUSCULAR; INTRAVENOUS; SUBCUTANEOUS EVERY 5 MIN PRN
Status: DISCONTINUED | OUTPATIENT
Start: 2018-01-08 | End: 2018-01-08 | Stop reason: HOSPADM

## 2018-01-08 RX ORDER — MEMANTINE HYDROCHLORIDE 10 MG/1
10 TABLET ORAL 2 TIMES DAILY
COMMUNITY
End: 2018-01-15

## 2018-01-08 RX ORDER — HEPARIN SODIUM 5000 [USP'U]/ML
5000 INJECTION, SOLUTION INTRAVENOUS; SUBCUTANEOUS EVERY 8 HOURS SCHEDULED
Status: DISCONTINUED | OUTPATIENT
Start: 2018-01-09 | End: 2018-01-15

## 2018-01-08 RX ORDER — SODIUM CHLORIDE 9 MG/ML
INJECTION, SOLUTION INTRAVENOUS CONTINUOUS
Status: DISCONTINUED | OUTPATIENT
Start: 2018-01-08 | End: 2018-01-08

## 2018-01-08 RX ORDER — CEFOXITIN 2 G/1
INJECTION, POWDER, FOR SOLUTION INTRAVENOUS
Status: DISCONTINUED | OUTPATIENT
Start: 2018-01-08 | End: 2018-01-08 | Stop reason: HOSPADM

## 2018-01-08 RX ORDER — ASPIRIN 81 MG/1
81 TABLET ORAL DAILY
COMMUNITY

## 2018-01-08 RX ORDER — BISACODYL 10 MG
10 SUPPOSITORY, RECTAL RECTAL
Status: DISCONTINUED | OUTPATIENT
Start: 2018-01-08 | End: 2018-01-15

## 2018-01-08 RX ORDER — NALOXONE HYDROCHLORIDE 0.4 MG/ML
80 INJECTION, SOLUTION INTRAMUSCULAR; INTRAVENOUS; SUBCUTANEOUS AS NEEDED
Status: DISCONTINUED | OUTPATIENT
Start: 2018-01-08 | End: 2018-01-08 | Stop reason: HOSPADM

## 2018-01-08 RX ORDER — FEBUXOSTAT 40 MG/1
40 TABLET, FILM COATED ORAL DAILY
COMMUNITY
End: 2018-01-15

## 2018-01-08 RX ORDER — HYDROMORPHONE HYDROCHLORIDE 1 MG/ML
0.8 INJECTION, SOLUTION INTRAMUSCULAR; INTRAVENOUS; SUBCUTANEOUS EVERY 2 HOUR PRN
Status: DISCONTINUED | OUTPATIENT
Start: 2018-01-08 | End: 2018-01-15

## 2018-01-08 RX ORDER — ONDANSETRON 2 MG/ML
4 INJECTION INTRAMUSCULAR; INTRAVENOUS EVERY 4 HOURS PRN
Status: DISCONTINUED | OUTPATIENT
Start: 2018-01-08 | End: 2018-01-08

## 2018-01-08 RX ORDER — ONDANSETRON 2 MG/ML
4 INJECTION INTRAMUSCULAR; INTRAVENOUS AS NEEDED
Status: DISCONTINUED | OUTPATIENT
Start: 2018-01-08 | End: 2018-01-08 | Stop reason: HOSPADM

## 2018-01-08 RX ORDER — BUPIVACAINE HYDROCHLORIDE AND EPINEPHRINE 5; 5 MG/ML; UG/ML
INJECTION, SOLUTION EPIDURAL; INTRACAUDAL; PERINEURAL AS NEEDED
Status: DISCONTINUED | OUTPATIENT
Start: 2018-01-08 | End: 2018-01-08 | Stop reason: HOSPADM

## 2018-01-08 RX ORDER — SODIUM CHLORIDE 9 MG/ML
INJECTION, SOLUTION INTRAVENOUS CONTINUOUS
Status: DISCONTINUED | OUTPATIENT
Start: 2018-01-08 | End: 2018-01-09

## 2018-01-08 RX ORDER — METOCLOPRAMIDE HYDROCHLORIDE 5 MG/ML
5 INJECTION INTRAMUSCULAR; INTRAVENOUS EVERY 6 HOURS PRN
Status: DISCONTINUED | OUTPATIENT
Start: 2018-01-08 | End: 2018-01-15

## 2018-01-08 RX ORDER — METOCLOPRAMIDE HYDROCHLORIDE 5 MG/ML
10 INJECTION INTRAMUSCULAR; INTRAVENOUS EVERY 6 HOURS PRN
Status: DISCONTINUED | OUTPATIENT
Start: 2018-01-08 | End: 2018-01-08

## 2018-01-08 RX ORDER — ONDANSETRON 2 MG/ML
4 INJECTION INTRAMUSCULAR; INTRAVENOUS EVERY 6 HOURS PRN
Status: DISCONTINUED | OUTPATIENT
Start: 2018-01-08 | End: 2018-01-15

## 2018-01-08 RX ORDER — HYDROCODONE BITARTRATE AND ACETAMINOPHEN 5; 325 MG/1; MG/1
2 TABLET ORAL EVERY 4 HOURS PRN
Status: DISCONTINUED | OUTPATIENT
Start: 2018-01-08 | End: 2018-01-15

## 2018-01-08 RX ORDER — SODIUM CHLORIDE, SODIUM LACTATE, POTASSIUM CHLORIDE, CALCIUM CHLORIDE 600; 310; 30; 20 MG/100ML; MG/100ML; MG/100ML; MG/100ML
INJECTION, SOLUTION INTRAVENOUS CONTINUOUS
Status: DISCONTINUED | OUTPATIENT
Start: 2018-01-08 | End: 2018-01-08 | Stop reason: HOSPADM

## 2018-01-08 RX ORDER — HYDROMORPHONE HYDROCHLORIDE 1 MG/ML
0.4 INJECTION, SOLUTION INTRAMUSCULAR; INTRAVENOUS; SUBCUTANEOUS EVERY 2 HOUR PRN
Status: DISCONTINUED | OUTPATIENT
Start: 2018-01-08 | End: 2018-01-15

## 2018-01-08 RX ORDER — DOCUSATE SODIUM 100 MG/1
100 CAPSULE, LIQUID FILLED ORAL 2 TIMES DAILY
Status: DISCONTINUED | OUTPATIENT
Start: 2018-01-08 | End: 2018-01-10

## 2018-01-08 RX ORDER — ONDANSETRON 2 MG/ML
4 INJECTION INTRAMUSCULAR; INTRAVENOUS ONCE
Status: COMPLETED | OUTPATIENT
Start: 2018-01-08 | End: 2018-01-08

## 2018-01-08 RX ORDER — FAMOTIDINE 10 MG/ML
20 INJECTION, SOLUTION INTRAVENOUS NIGHTLY
Status: DISCONTINUED | OUTPATIENT
Start: 2018-01-08 | End: 2018-01-15

## 2018-01-08 RX ORDER — SODIUM CHLORIDE 9 MG/ML
1000 INJECTION, SOLUTION INTRAVENOUS ONCE
Status: COMPLETED | OUTPATIENT
Start: 2018-01-08 | End: 2018-01-08

## 2018-01-08 RX ORDER — HYDROMORPHONE HYDROCHLORIDE 1 MG/ML
1 INJECTION, SOLUTION INTRAMUSCULAR; INTRAVENOUS; SUBCUTANEOUS EVERY 2 HOUR PRN
Status: DISCONTINUED | OUTPATIENT
Start: 2018-01-08 | End: 2018-01-10

## 2018-01-08 RX ORDER — HEPARIN SODIUM 5000 [USP'U]/ML
5000 INJECTION, SOLUTION INTRAVENOUS; SUBCUTANEOUS EVERY 8 HOURS SCHEDULED
Status: DISCONTINUED | OUTPATIENT
Start: 2018-01-08 | End: 2018-01-08

## 2018-01-08 RX ORDER — ACETAMINOPHEN 325 MG/1
650 TABLET ORAL EVERY 6 HOURS PRN
Status: DISCONTINUED | OUTPATIENT
Start: 2018-01-08 | End: 2018-01-15

## 2018-01-08 RX ORDER — HYDROCODONE BITARTRATE AND ACETAMINOPHEN 5; 325 MG/1; MG/1
1 TABLET ORAL EVERY 4 HOURS PRN
Status: DISCONTINUED | OUTPATIENT
Start: 2018-01-08 | End: 2018-01-15

## 2018-01-08 RX ORDER — ACETAMINOPHEN 325 MG/1
650 TABLET ORAL EVERY 6 HOURS PRN
COMMUNITY
End: 2018-05-02 | Stop reason: ALTCHOICE

## 2018-01-08 RX ORDER — METOCLOPRAMIDE HYDROCHLORIDE 5 MG/ML
10 INJECTION INTRAMUSCULAR; INTRAVENOUS AS NEEDED
Status: DISCONTINUED | OUTPATIENT
Start: 2018-01-08 | End: 2018-01-08 | Stop reason: HOSPADM

## 2018-01-08 RX ORDER — SODIUM CHLORIDE 0.9 % (FLUSH) 0.9 %
10 SYRINGE (ML) INJECTION EVERY 12 HOURS
Status: DISCONTINUED | OUTPATIENT
Start: 2018-01-08 | End: 2018-01-08

## 2018-01-08 NOTE — CONSULTS
Critical Care H&P/Consult       NAME: Allegra Catalan - ROOM: [facility] - MRN: [id number] - Age: 77year old - :  1940    Date of Admission: 2018 10:40 AM  Admission Diagnosis: No admission diagnoses are documented for this encounter.   Reason for Cons encounter [facility] Encounter).     Scheduled Medication:    Continuous Infusing Medication:    PRN Medication:     REVIEW OF SYSTEMS:   GENERAL:  feels well otherwise   SKIN:  denies any unusual skin lesions   EYES:  denies blurred vision or double vision gallop   Abdomen:     Soft/TTP in right groin, mass noted, mild abd distension   Extremities:   Extremities normal, atraumatic, no cyanosis or edema   Pulses:   2+ and symmetric all extremities   Skin:   Skin color, texture, turgor normal, no rashes or les sepsis  -monitor w/ IVF and supportive care  8. FEN  -NPO  -monitor lytes, replace per renal  9. Proph  -SCDs  -start anticoagulation when ok w/ surgery  10.  Dispo  -ICU        Critical Care Time greater than: [address]  DM

## 2018-01-08 NOTE — ED PROVIDER NOTES
Patient Seen in: [facility] Emergency Department    History   Patient presents with:  Nausea/Vomiting/Diarrhea (gastrointestinal)    Stated Complaint: NVD    HPI    Patient is a 77-year-old male presents emergency room with a history of multiple epi nontoxic in appearance. HEENT: Head is normocephalic, atraumatic. Pupils are 4 mm equally round and reactive to light. Oropharynx is clear. Mucous membranes are dry. NECK: There is no focal tenderness to palpation appreciated. There is no JVD.  No menin following orders were created for panel order CBC WITH DIFFERENTIAL WITH PLATELET.   Procedure                               Abnormality         Status                     ---------                               -----------         ------ Patient's bicarb was found to be 19. This is associated with acute kidney injury. Patient had blood work drawn here in the emergency room. Patient was given IV fluid and had improvement in symptoms after this was given.   Patient had blood cultures drawn pm    Follow-up:  No follow-up provider specified.       Medications Prescribed:  Current Discharge Medication List        Present on Admission  Date Reviewed: 1/9/2017          ICD-10-CM Noted POA    Hyperkalemia E87.5 1/8/2018 Yes    Hyponatremia E87.1 1/

## 2018-01-08 NOTE — PLAN OF CARE
Received patient from ER via stretcher. A&OX4. Placed on monitors and oriented to room. Admission navigator completed. Received a total of 3L 0.9 in ER.  Bicarb gtt started per Renal. Given 1L per critical care for hypotension approx 1620 with improvement i

## 2018-01-08 NOTE — CONSULTS
[facility] Note:  Renal Adjustment for piperacillin/tazobactam (Neel Ceron)    Rashedea Villanueva is a 77year old male who has been prescribed piperacillin/tazobactam (ZOSYN) 3750 mg every 8 hrs.   CrCl is estimated creatinine clearance is 8.4 mL/min (based on SC

## 2018-01-08 NOTE — ED NOTES
hospitalist Steven Lou and Dr. Sherif LEDEZMA at bedside. Pt aware of his admission and possible surgery as explained to him by Dr. Sherif LEDEZMA, pt verbalizing understanding.

## 2018-01-08 NOTE — ANESTHESIA PREPROCEDURE EVALUATION
PRE-OP EVALUATION    Patient Name: Ivory Laguna    Pre-op Diagnosis: Hernia with obstruction [K46.0]    Procedure(s):  RIGHT LAPAROSCOPIC INCARCERATED HERNIA REPAIR, POSSIBLE OPEN     Surgeon(s) and Role:     * Jyoti Maria MD - Primary    Pre-op Available pre-op labs reviewed.     Lab Results  Component Value Date   WBC 18.4 (H) 01/08/2018   RBC 3.70 (L) 01/08/2018   HGB 11.9 (L) 01/08/2018   HCT 34.7 (L) 01/08/2018   MCV 93.8 01/08/2018   MCH 32.2 01/08/2018   MCHC 34.3 01/08/2018   RDW 13.2 0

## 2018-01-08 NOTE — CONSULTS
[facility]  Report of Consultation    Yarelis Burleson Patient Status:  Emergency    1940 MRN [id number]   Location [facility] [facility] Attending Kyle Esqueda MD   Hosp Day # 0 PCP None Pcp     Reason for Consultation:  GINNY/C Vitamins-Minerals (CERTAVITE SENIOR/ANTIOXIDANT) Oral Tab Take 1 tablet by mouth daily. cholecalciferol 5000 units Oral Cap Take 5,000 Units by mouth daily. levETIRAcetam 500 MG Oral Tab Take 500 mg by mouth 2 (two) times daily.    ferrous sulfate 325 ( Data:    Lab Results  Component Value Date   WBC 18.4 01/08/2018   HGB 11.9 01/08/2018   HCT 34.7 01/08/2018   .0 01/08/2018   CREATSERUM 6.89 01/08/2018   BUN 80 01/08/2018    01/08/2018   K 5.2 01/08/2018   CL 99 01/08/2018   CO2 19.0 01/08/

## 2018-01-08 NOTE — CONSULTS
[facility]  Report of Consultation    Dawitdejah Jeff Patient Status:  Emergency    1940 MRN [id number]   Location [facility] [facility] Attending Carisa Escalante MD   Hosp Day # 0 PCP None Pcp     Reason for Consultation:  Nikki Finley performed all medical decision making.     Judene Kayser, [place]      History:  Past Medical History:   Diagnosis Date   • Dementia    • Depression    • Seizure [facility])      Past Surgical History:  No date: CATARACT  Family History   Problem Relation Age of Ons questions regarding his medical history. Cooperative. No apparent distress. HEENT: NG tube in place with large amount of thick brown discharge present in the tubing and suction canister. Exam is unremarkable. Without scleral icterus.   Mucous membran symphysis. Dose reduction techniques were used. Dose information is transmitted to the [facility]) NRDR ([address]) which includes the Dose Index Registry.      PATIENT STATED HISTORY: (As transcribed by Missael The bowel loop which extends out of the hernia sac is not distended. The location of the hernia is   in the distal 3rd of the ileum. There is no free intraperitoneal air identified. No bowel wall thickening appreciated. The colon is decompressed.   ABDO Andreia at 1250 hr on 1/8/2018.           Dictated by: Femi Katz MD on 1/08/2018 at 12:51       Approved by: Femi Katz MD                   Impression:  Patient Active Problem List:     Confusional state     Shaking spells     Non-complian patient, however, there was no answer at this time. We will reattempt contact with her later this afternoon. Plan:  1.  The patient will be taken to the operating room later this afternoon with Dr. Ami Montanez for laparoscopic, possible open, right inguinal

## 2018-01-08 NOTE — PROGRESS NOTES
[facility]      Sepsis Reassessment Note    /60   Pulse 91   Temp (!) 97.4 °F (36.3 °C) (Temporal)   Resp 19   Ht 172.7 cm (5' 8\")   Wt 154 lb (69.9 kg)   SpO2 96%   BMI 23.42 kg/m²          Cardiac:  Regularity: Regular  Rate: Normal  Heart

## 2018-01-08 NOTE — H&P
[facility]  History and Physical     Haseeb Bailey Patient Status:  Emergency    1940 MRN [id number]   Location [address] [facility] Attending Leamon Epley, MD   Hosp Day # 0 PCP None Pcp     Chief Complaint: N/V/D    H Tab Take 40 mg by mouth daily. Disp:  Rfl:    Multiple Vitamins-Minerals (CERTAVITE SENIOR/ANTIOXIDANT) Oral Tab Take 1 tablet by mouth daily. Disp:  Rfl:    cholecalciferol 5000 units Oral Cap Take 5,000 Units by mouth daily.  Disp:  Rfl:    levETIRAcetam 51   AST  45*   ALT  15*   BILT  1.0   TP  8.7*       Estimated Creatinine Clearance: 8.4 mL/min (based on SCr of 6.89 mg/dL (H)). No results for input(s): PTP, INR in the last 72 hours. No results for input(s): TROP, CK in the last 72 hours.     Imag

## 2018-01-09 ENCOUNTER — APPOINTMENT (OUTPATIENT)
Dept: GENERAL RADIOLOGY | Facility: HOSPITAL | Age: 78
DRG: 853 | End: 2018-01-09
Attending: EMERGENCY MEDICINE
Payer: MEDICARE

## 2018-01-09 LAB
ALBUMIN SERPL-MCNC: 2.1 G/DL (ref 3.5–4.8)
ALP LIVER SERPL-CCNC: 34 U/L (ref 45–117)
ALT SERPL-CCNC: 11 U/L (ref 17–63)
AST SERPL-CCNC: 36 U/L (ref 15–41)
BASOPHILS # BLD AUTO: 0.06 X10(3) UL (ref 0–0.1)
BASOPHILS NFR BLD AUTO: 0.5 %
BILIRUB SERPL-MCNC: 0.6 MG/DL (ref 0.1–2)
BUN BLD-MCNC: 77 MG/DL (ref 8–20)
CALCIUM BLD-MCNC: 8.3 MG/DL (ref 8.3–10.3)
CHLORIDE: 109 MMOL/L (ref 101–111)
CO2: 24 MMOL/L (ref 22–32)
CORTISOL: 33.3 UG/DL
CREAT BLD-MCNC: 6.57 MG/DL (ref 0.7–1.3)
EOSINOPHIL # BLD AUTO: 0.01 X10(3) UL (ref 0–0.3)
EOSINOPHIL NFR BLD AUTO: 0.1 %
ERYTHROCYTE [DISTWIDTH] IN BLOOD BY AUTOMATED COUNT: 13.6 % (ref 11.5–16)
GLUCOSE BLD-MCNC: 90 MG/DL (ref 70–99)
HCT VFR BLD AUTO: 26.5 % (ref 37–53)
HGB BLD-MCNC: 8.7 G/DL (ref 13–17)
IMMATURE GRANULOCYTE COUNT: 0.23 X10(3) UL (ref 0–1)
IMMATURE GRANULOCYTE RATIO %: 1.8 %
LACTIC ACID: 1.4 MMOL/L (ref 0.5–2)
LYMPHOCYTES # BLD AUTO: 1.45 X10(3) UL (ref 0.9–4)
LYMPHOCYTES NFR BLD AUTO: 11.4 %
M PROTEIN MFR SERPL ELPH: 5.7 G/DL (ref 6.1–8.3)
MCH RBC QN AUTO: 32.3 PG (ref 27–33.2)
MCHC RBC AUTO-ENTMCNC: 32.8 G/DL (ref 31–37)
MCV RBC AUTO: 98.5 FL (ref 80–99)
MONOCYTES # BLD AUTO: 1.11 X10(3) UL (ref 0.1–0.6)
MONOCYTES NFR BLD AUTO: 8.8 %
NEUTROPHIL ABS PRELIM: 9.81 X10 (3) UL (ref 1.3–6.7)
NEUTROPHILS # BLD AUTO: 9.81 X10(3) UL (ref 1.3–6.7)
NEUTROPHILS NFR BLD AUTO: 77.4 %
PLATELET # BLD AUTO: 210 10(3)UL (ref 150–450)
PLATELET MORPHOLOGY: NORMAL
POTASSIUM SERPL-SCNC: 5 MMOL/L (ref 3.6–5.1)
RBC # BLD AUTO: 2.69 X10(6)UL (ref 3.8–5.8)
RED CELL DISTRIBUTION WIDTH-SD: 48.8 FL (ref 35.1–46.3)
SODIUM SERPL-SCNC: 144 MMOL/L (ref 136–144)
WBC # BLD AUTO: 12.7 X10(3) UL (ref 4–13)

## 2018-01-09 PROCEDURE — 99233 SBSQ HOSP IP/OBS HIGH 50: CPT | Performed by: INTERNAL MEDICINE

## 2018-01-09 PROCEDURE — 71045 X-RAY EXAM CHEST 1 VIEW: CPT | Performed by: NURSE PRACTITIONER

## 2018-01-09 RX ORDER — SODIUM CHLORIDE, SODIUM LACTATE, POTASSIUM CHLORIDE, CALCIUM CHLORIDE 600; 310; 30; 20 MG/100ML; MG/100ML; MG/100ML; MG/100ML
INJECTION, SOLUTION INTRAVENOUS CONTINUOUS
Status: DISCONTINUED | OUTPATIENT
Start: 2018-01-09 | End: 2018-01-09

## 2018-01-09 RX ORDER — SODIUM CHLORIDE 9 MG/ML
INJECTION, SOLUTION INTRAVENOUS CONTINUOUS
Status: DISCONTINUED | OUTPATIENT
Start: 2018-01-09 | End: 2018-01-09

## 2018-01-09 RX ORDER — SODIUM CHLORIDE 450 MG/100ML
INJECTION, SOLUTION INTRAVENOUS CONTINUOUS
Status: DISCONTINUED | OUTPATIENT
Start: 2018-01-09 | End: 2018-01-10

## 2018-01-09 NOTE — PROGRESS NOTES
[facility] Note:  Renal Dose Adjustment for Metoclopramide (REGLAN)    Tiffanie Gabriel has been prescribed Metoclopramide (REGLAN) 10 mg every 6 hours as needed for nausea. Estimated Creatinine Clearance: 8.4 mL/min (based on SCr of 6.89 mg/dL (H)).

## 2018-01-09 NOTE — PROGRESS NOTES
[facility]acy Note:  Renal Dose Adjustment    Jaime Mina has been prescribed famotidine (PEPCID) 20 mg intravenously every 12 hours. Estimated Creatinine Clearance: 8.4 mL/min (based on SCr of 6.89 mg/dL (H)).     His calculated creatinine clearance i

## 2018-01-09 NOTE — PLAN OF CARE
Resumed care of patient at 1900. A&OX4. Afebrile, sats adequate on 2LNC. Levophed infusing through mid-line, titrating as able. Rates pain 5/10, PRN dilaudid. NGT clamped x4h per Dr. John Tinoco, replaced to LIS at 1600 with minimal output.  Clear liquid diet s

## 2018-01-09 NOTE — OPERATIVE REPORT
PREOPERATIVE DIAGNOSIS:  Right inguinal hernia. POSTOPERATIVE DIAGNOSIS:  Right inguinal hernia.    PROCEDURE PERFORMED: Laparoscopic right inguinal hernia repair with mesh  SURGEON:  ABE Case Kins: BALA Broderick reduced back into the abdomen. There was an area of slight duskiness that pinked up during the surgery. The peritoneum was grasped and incised using scissors with cautery.   Blunt dissection was then used to dissect out the pubic tubercle as well as the l

## 2018-01-09 NOTE — CM/SW NOTE
MSW left a message for the patient's daughter Anuja Acosta who is listed as POA to complete dc planning assessment. Awaiting return call.     Francesca Hartmann, [facility]

## 2018-01-09 NOTE — PROGRESS NOTES
[facility]  Progress Note     Haseeb Bailey Patient Status:  Inpatient    1940 MRN [id number]   [facility] 4SW-A Attending Kristeen Hashimoto, MD   [address] Day # 1 PCP None Pcp     Chief Complaint: N/V     S: Patient underwent lap i Intravenous Q12H       ASSESSMENT / PLAN:     1. Incarcerated Hernia/Small Bowel Obstruction  1. POD 1  2. Continue NG  3. IVF  4. Pain control  2. Septick Shock  1. Aggressive IVF  2. Wean pressors as tolerated  3.  monitor in ICU  4.  Empiric abx for poss

## 2018-01-09 NOTE — PROGRESS NOTES
[facility]  Nephrology Progress Note    Haseeb Bailey Patient Status:  Inpatient    1940 MRN [id number]   [facility] 4SW-A Attending Kristeen Hashimoto, MD   [facility] Day # 1 PCP None Pcp       SUBJECTIVE:  S/p OR yest.  Appears comfort 01/08/18   1044  01/09/18   0435   ALT  15*  11*   AST  45*  36   ALB  3.3*  2.1*       No results for input(s): PGLU in the last 72 hours.     Meds:     Current Facility-Administered Medications:  0.9%  NaCl infusion  Intravenous Continuous   acetaminophen change to hypotonic IVF    #3. Acidosis- resolved. D/c bicarb gtt    #4. Septic shock- improved. Cont abx    #5. Incarcerated inguinal hernia- s/p repair. Per surgery      Questions/concerns were discussed with patient and/or family by bedside.

## 2018-01-09 NOTE — PROGRESS NOTES
Patient returned from OR acute bowel obstruction 2/2 incarcerated right inguinal hernia, now s/p laparoscopic right inguinal hernia repair with mesh. Extubated prior to arrival in ICU. Reported  ml.   Patient has received approximately 5L IVF since

## 2018-01-09 NOTE — PROGRESS NOTES
[address] Patient Status:  Inpatient    1940 MRN [id number]   [facility] 4SW-A Attending Margarita Powers MD   [facility] Day # 1 PCP None Pcp     Critical Care Progress Note     Date of Admission: 2018 10:40 AM (DILAUDID) 1 MG/ML injection 1 mg, 1 mg, Intravenous, Q2H PRN  •  famoTIDine (PEPCID) injection 20 mg, 20 mg, Intravenous, Nightly  •  Metoclopramide HCl (REGLAN) injection 5 mg, 5 mg, Intravenous, Q6H PRN  •  norepinephrine (LEVOPHED) 4 mg/250 ml premix i prn  2. ID  -incarcerated hernia w/ bowel obstruction, possible PNA  -cont zosyn  -monitor cultures, adjust accordingly  -apparently there was noravirus infection outbreak at his NH- now with some diarrhea; cont with isolation for now  3.  Incarcerated esther

## 2018-01-09 NOTE — PROGRESS NOTES
[facility]  Progress Note    Yarelis Burleson Patient Status:  Inpatient    1940 MRN [id number]   [facility] 4SW-A Attending Emma Luna MD   Hosp Day # 1 PCP None Pcp     Subjective:  Pt resting in bed, denies complaints, repo tolerates   4. DVT prophylaxis - may begin SQ heparin q12   5. IV zosyn   6. GI prophylaxis    [place] Valdivia, [address]  1/9/2018  12:14 PM    ADDENDUM:     Patient was seen and examined. He is feeling better.   He complains of pain in his right groin.     Gene

## 2018-01-09 NOTE — PLAN OF CARE
Assumed care of patient after arrival from PACU. Received patient resting in bed, NGT reattached to suction revealing dark green/bile, lap access sites intact (center site with small amount of drainage).   Large liquid brown/green stool; rectal pouch appli

## 2018-01-09 NOTE — ANESTHESIA POSTPROCEDURE EVALUATION
[address] Patient Status:  Inpatient   Age/Gender 77year old male MRN [id number]   Location [address] [facility] Attending Tiffany Johnson MD   Hosp Day # 0 PCP None Pcp       Anesthesia Post-op Note    Procedure(s):  RIGHT LAP

## 2018-01-10 ENCOUNTER — APPOINTMENT (OUTPATIENT)
Dept: GENERAL RADIOLOGY | Facility: HOSPITAL | Age: 78
DRG: 853 | End: 2018-01-10
Attending: INTERNAL MEDICINE
Payer: MEDICARE

## 2018-01-10 LAB
ALBUMIN SERPL-MCNC: 2.1 G/DL (ref 3.5–4.8)
ALP LIVER SERPL-CCNC: 39 U/L (ref 45–117)
ALT SERPL-CCNC: 11 U/L (ref 17–63)
AST SERPL-CCNC: 29 U/L (ref 15–41)
BAND %: 6 %
BASOPHIL % MANUAL: 0 %
BASOPHIL ABSOLUTE MANUAL: 0 X10(3) UL (ref 0–0.1)
BILIRUB SERPL-MCNC: 0.5 MG/DL (ref 0.1–2)
BUN BLD-MCNC: 65 MG/DL (ref 8–20)
CALCIUM BLD-MCNC: 8.1 MG/DL (ref 8.3–10.3)
CHLORIDE: 108 MMOL/L (ref 101–111)
CO2: 23 MMOL/L (ref 22–32)
CREAT BLD-MCNC: 4.63 MG/DL (ref 0.7–1.3)
EOSINOPHIL % MANUAL: 0 %
EOSINOPHIL ABSOLUTE MANUAL: 0 X10(3) UL (ref 0–0.3)
ERYTHROCYTE [DISTWIDTH] IN BLOOD BY AUTOMATED COUNT: 13.5 % (ref 11.5–16)
GLUCOSE BLD-MCNC: 107 MG/DL (ref 70–99)
HAV IGM SER QL: 2.5 MG/DL (ref 1.7–3)
HCT VFR BLD AUTO: 25.3 % (ref 37–53)
HGB BLD-MCNC: 8.5 G/DL (ref 13–17)
LYMPHOCYTE % MANUAL: 8 %
LYMPHOCYTE ABSOLUTE MANUAL: 1.26 X10(3) UL (ref 0.9–4)
M PROTEIN MFR SERPL ELPH: 6.2 G/DL (ref 6.1–8.3)
MCH RBC QN AUTO: 32.8 PG (ref 27–33.2)
MCHC RBC AUTO-ENTMCNC: 33.6 G/DL (ref 31–37)
MCV RBC AUTO: 97.7 FL (ref 80–99)
MONOCYTE % MANUAL: 4 %
MONOCYTE ABSOLUTE MANUAL: 0.63 X10(3) UL (ref 0.1–0.6)
MORPHOLOGY: NORMAL
MYELOCYTE %: 2 %
MYELOCYTE ABSOLUTE MANUAL: 0.31 X10(3) UL (ref ?–0.01)
NEUTROPHIL ABS PRELIM: 13.27 X10 (3) UL (ref 1.3–6.7)
NEUTROPHIL ABSOLUTE MANUAL: 13.5 X10(3) UL (ref 1.3–6.7)
NEUTROPHILS % MANUAL: 80 %
PHOSPHATE SERPL-MCNC: 3 MG/DL (ref 2.5–4.9)
PLATELET # BLD AUTO: 200 10(3)UL (ref 150–450)
PLATELET MORPHOLOGY: NORMAL
POTASSIUM SERPL-SCNC: 4.7 MMOL/L (ref 3.6–5.1)
RBC # BLD AUTO: 2.59 X10(6)UL (ref 3.8–5.8)
RED CELL DISTRIBUTION WIDTH-SD: 48.2 FL (ref 35.1–46.3)
SODIUM SERPL-SCNC: 137 MMOL/L (ref 136–144)
TOTAL CELLS COUNTED: 100
WBC # BLD AUTO: 15.7 X10(3) UL (ref 4–13)

## 2018-01-10 PROCEDURE — 99233 SBSQ HOSP IP/OBS HIGH 50: CPT | Performed by: INTERNAL MEDICINE

## 2018-01-10 PROCEDURE — 71045 X-RAY EXAM CHEST 1 VIEW: CPT | Performed by: INTERNAL MEDICINE

## 2018-01-10 PROCEDURE — 99233 SBSQ HOSP IP/OBS HIGH 50: CPT | Performed by: HOSPITALIST

## 2018-01-10 RX ORDER — FUROSEMIDE 10 MG/ML
80 INJECTION INTRAMUSCULAR; INTRAVENOUS ONCE
Status: COMPLETED | OUTPATIENT
Start: 2018-01-10 | End: 2018-01-10

## 2018-01-10 RX ORDER — ALBUMIN (HUMAN) 12.5 G/50ML
25 SOLUTION INTRAVENOUS ONCE
Status: COMPLETED | OUTPATIENT
Start: 2018-01-10 | End: 2018-01-10

## 2018-01-10 NOTE — CM/SW NOTE
01/10/18 1600   CM/SW Referral Data   Referral Source Physician   Reason for Referral Discharge planning   Informant Patient   Patient Info   Patient's Mental Status Alert;Oriented   Patient's [address] Name [facility]

## 2018-01-10 NOTE — PROGRESS NOTES
[facility]  Nephrology Progress Note    Carolyn Landrum Patient Status:  Inpatient    1940 MRN [id number]   [facility] 4SW-A Attending Mercy Wolfe MD   Hosp Day # 2 PCP None Pcp       SUBJECTIVE:  NG out, tolerating clears.   O 109  108   CO2  19.0*  24.0  23.0   BUN  80*  77*  65*   CREATSERUM  6.89*  6.57*  4.63*   CA  11.6*  8.3  8.1*   MG   --    --   2.5   PHOS   --    --   3.0   GLU  130*  90  107*       Recent Labs   Lab  01/08/18   1044  01/09/18   0435  01/10/18   0459 anticipate ongoing recovery of renal fxn. #2. Hypernatremia- better today after change to hypotonic IVF. Will follow    #3. Acidosis- due to shock in setting of GINNY. resolved. #4. Septic shock- improved. Cont weaning pressors.   Remains on abx

## 2018-01-10 NOTE — PROGRESS NOTES
[address] Patient Status:  Inpatient    1940 MRN [id number]   [facility] 4SW-A Attending Winnie Kong MD   Hosp Day # 2 PCP None Pcp     Pulm / Critical Care Progress Note     S: pt states that he feels ok.  Madai Underwood 34.7*  26.5*  25.3*   PLT  263.0  210.0  200.0     No results for input(s): INR in the last 72 hours.       Recent Labs   Lab  01/08/18   1044  01/09/18   0435  01/10/18   0459   NA  133*  144  137   K  5.2*  5.0  4.7   CL  99*  109  108   CO2  19.0*  24.0

## 2018-01-10 NOTE — PROGRESS NOTES
[facility]  Progress Note    Ameya Nava Patient Status:  Inpatient    1940 MRN [id number]   [facility] 4SW-A Attending Chris Mcdonnell MD   Hosp Day # 2 PCP None Pcp     Subjective:  NGT out.   Tolerated clears with slight nause

## 2018-01-10 NOTE — PROGRESS NOTES
[facility]  Progress Note     Willow Grissom Patient Status:  Inpatient    1940 MRN [id number]   [facility] 4SW-A Attending Radha Haskins MD   [address] Day # 2 PCP None Pcp     Chief Complaint: N/V     S: Patient has some operat Intravenous Once   • levETIRAcetam  500 mg Intravenous 2 times per day   • Heparin Sodium (Porcine)  5,000 Units Subcutaneous Q8H [facility]   • famoTIDine  20 mg Intravenous Nightly   • piperacillin-tazobactam  3.375 g Intravenous Q12H       ASSESSMENT / PLAN:

## 2018-01-11 LAB
ALBUMIN SERPL-MCNC: 2.3 G/DL (ref 3.5–4.8)
ALP LIVER SERPL-CCNC: 52 U/L (ref 45–117)
ALT SERPL-CCNC: 12 U/L (ref 17–63)
AST SERPL-CCNC: 24 U/L (ref 15–41)
BASOPHILS # BLD AUTO: 0.03 X10(3) UL (ref 0–0.1)
BASOPHILS NFR BLD AUTO: 0.3 %
BILIRUB SERPL-MCNC: 0.6 MG/DL (ref 0.1–2)
BUN BLD-MCNC: 56 MG/DL (ref 8–20)
CALCIUM BLD-MCNC: 8.3 MG/DL (ref 8.3–10.3)
CHLORIDE: 105 MMOL/L (ref 101–111)
CO2: 26 MMOL/L (ref 22–32)
CREAT BLD-MCNC: 3.61 MG/DL (ref 0.7–1.3)
EOSINOPHIL # BLD AUTO: 0.18 X10(3) UL (ref 0–0.3)
EOSINOPHIL NFR BLD AUTO: 1.9 %
ERYTHROCYTE [DISTWIDTH] IN BLOOD BY AUTOMATED COUNT: 13.8 % (ref 11.5–16)
GLUCOSE BLD-MCNC: 109 MG/DL (ref 70–99)
HAV IGM SER QL: 2 MG/DL (ref 1.7–3)
HCT VFR BLD AUTO: 24 % (ref 37–53)
HGB BLD-MCNC: 7.9 G/DL (ref 13–17)
IMMATURE GRANULOCYTE COUNT: 0.38 X10(3) UL (ref 0–1)
IMMATURE GRANULOCYTE RATIO %: 4 %
LYMPHOCYTES # BLD AUTO: 0.8 X10(3) UL (ref 0.9–4)
LYMPHOCYTES NFR BLD AUTO: 8.4 %
M PROTEIN MFR SERPL ELPH: 6.3 G/DL (ref 6.1–8.3)
MCH RBC QN AUTO: 32.1 PG (ref 27–33.2)
MCHC RBC AUTO-ENTMCNC: 32.9 G/DL (ref 31–37)
MCV RBC AUTO: 97.6 FL (ref 80–99)
MONOCYTES # BLD AUTO: 0.96 X10(3) UL (ref 0.1–0.6)
MONOCYTES NFR BLD AUTO: 10.1 %
NEUTROPHIL ABS PRELIM: 7.17 X10 (3) UL (ref 1.3–6.7)
NEUTROPHILS # BLD AUTO: 7.17 X10(3) UL (ref 1.3–6.7)
NEUTROPHILS NFR BLD AUTO: 75.3 %
PHOSPHATE SERPL-MCNC: 1.7 MG/DL (ref 2.5–4.9)
PLATELET # BLD AUTO: 173 10(3)UL (ref 150–450)
POTASSIUM SERPL-SCNC: 4 MMOL/L (ref 3.6–5.1)
RBC # BLD AUTO: 2.46 X10(6)UL (ref 3.8–5.8)
RED CELL DISTRIBUTION WIDTH-SD: 48.5 FL (ref 35.1–46.3)
SODIUM SERPL-SCNC: 138 MMOL/L (ref 136–144)
WBC # BLD AUTO: 9.5 X10(3) UL (ref 4–13)

## 2018-01-11 PROCEDURE — 99233 SBSQ HOSP IP/OBS HIGH 50: CPT | Performed by: INTERNAL MEDICINE

## 2018-01-11 PROCEDURE — 99232 SBSQ HOSP IP/OBS MODERATE 35: CPT | Performed by: HOSPITALIST

## 2018-01-11 RX ORDER — FUROSEMIDE 10 MG/ML
80 INJECTION INTRAMUSCULAR; INTRAVENOUS ONCE
Status: COMPLETED | OUTPATIENT
Start: 2018-01-11 | End: 2018-01-11

## 2018-01-11 RX ORDER — ALBUMIN (HUMAN) 12.5 G/50ML
25 SOLUTION INTRAVENOUS ONCE
Status: COMPLETED | OUTPATIENT
Start: 2018-01-11 | End: 2018-01-11

## 2018-01-11 NOTE — PROGRESS NOTES
[facility]  Progress Note    Ruth Minor Patient Status:  Inpatient    1940 MRN [id number]   [facility] 4SW-A Attending Kirt Arevalo MD   Hosp Day # 3 PCP None Pcp     Subjective:  Pt tolerating small amounts of diet.   Only w

## 2018-01-11 NOTE — PLAN OF CARE
CARDIOVASCULAR - ADULT    • Maintains optimal cardiac output and hemodynamic stability Progressing          RESPIRATORY - ADULT    • Achieves optimal ventilation and oxygenation Progressing          Received pt this am following RN report.  Pt awake, orient

## 2018-01-11 NOTE — PROGRESS NOTES
Isaías Duke Patient Status:  Inpatient    1940 MRN [id number]   [facility] 4SW-A Attending Raquel Hernández MD   Hosp Day # 3 PCP None Pcp     Critical Care Progress Note    Assessment/Plan:  1.  Septic shock: secondary to incarcerat Pulse 65   Temp 100 °F (37.8 °C) (Temporal)   Resp 20   Ht 5' 8\" (1.727 m)   Wt 171 lb 11.8 oz (77.9 kg)   SpO2 96%   BMI 26.11 kg/m²   Physical Exam:   General: alert, cooperative   HEENT: lips, mucosa, tongue normal. Mallampati II   Lungs: diminished,

## 2018-01-11 NOTE — PROGRESS NOTES
[facility]  Nephrology Progress Note    Lesley Carrion Patient Status:  Inpatient    1940 MRN [id number]   [facility] 4SW-A Attending Yvonne Willis MD   [facility] Day # 3 PCP None Pcp       SUBJECTIVE:  Up in chair, off pressors, c/ 01/10/18   0459  01/11/18   0538   NA  133*  144  137  138   K  5.2*  5.0  4.7  4.0   CL  99*  109  108  105   CO2  19.0*  24.0  23.0  26.0   BUN  80*  77*  65*  56*   CREATSERUM  6.89*  6.57*  4.63*  3.61*   CA  11.6*  8.3  8.1*  8.3   MG   --    --   2.5 Impression/Plan:      #1. GINNY- due to shock in setting of N/V and incarcerated hernia. Creat again sig better today and I would anticipate ongoing renal recovery. UOP excellent. IV albumin/lasix again today    #2.   Hypernatremia- resolved, cont

## 2018-01-11 NOTE — PROGRESS NOTES
[facility]  Progress Note     Allegra Catalan Patient Status:  Inpatient    1940 MRN [id number]   [facility] 4SW-A Attending Zuleyma Beard MD   [address] Day # 3 PCP None Pcp     Chief Complaint: N/V     S: Patient has some operat Epic.    Medications:   • potassium & sodium phosphates  1 packet Oral TID CC   • Albumin Human  25 g Intravenous Once   • furosemide  80 mg Intravenous Once   • levETIRAcetam  500 mg Intravenous 2 times per day   • Heparin Sodium (Porcine)  5,000 Units [place]

## 2018-01-12 LAB
ALBUMIN SERPL-MCNC: 3 G/DL (ref 3.5–4.8)
ALP LIVER SERPL-CCNC: 69 U/L (ref 45–117)
ALT SERPL-CCNC: 12 U/L (ref 17–63)
AST SERPL-CCNC: 21 U/L (ref 15–41)
BAND %: 2 %
BASOPHIL % MANUAL: 0 %
BASOPHIL ABSOLUTE MANUAL: 0 X10(3) UL (ref 0–0.1)
BILIRUB SERPL-MCNC: 0.9 MG/DL (ref 0.1–2)
BUN BLD-MCNC: 45 MG/DL (ref 8–20)
CALCIUM BLD-MCNC: 9.3 MG/DL (ref 8.3–10.3)
CHLORIDE: 100 MMOL/L (ref 101–111)
CO2: 27 MMOL/L (ref 22–32)
CREAT BLD-MCNC: 3.07 MG/DL (ref 0.7–1.3)
EOSINOPHIL % MANUAL: 0 %
EOSINOPHIL ABSOLUTE MANUAL: 0 X10(3) UL (ref 0–0.3)
ERYTHROCYTE [DISTWIDTH] IN BLOOD BY AUTOMATED COUNT: 12.9 % (ref 11.5–16)
GLUCOSE BLD-MCNC: 96 MG/DL (ref 70–99)
HAV IGM SER QL: 1.7 MG/DL (ref 1.7–3)
HCT VFR BLD AUTO: 29.6 % (ref 37–53)
HGB BLD-MCNC: 9.7 G/DL (ref 13–17)
LYMPHOCYTE % MANUAL: 10 %
LYMPHOCYTE ABSOLUTE MANUAL: 1.12 X10(3) UL (ref 0.9–4)
M PROTEIN MFR SERPL ELPH: 7.5 G/DL (ref 6.1–8.3)
MCH RBC QN AUTO: 31.3 PG (ref 27–33.2)
MCHC RBC AUTO-ENTMCNC: 32.8 G/DL (ref 31–37)
MCV RBC AUTO: 95.5 FL (ref 80–99)
MONOCYTE % MANUAL: 8 %
MONOCYTE ABSOLUTE MANUAL: 0.9 X10(3) UL (ref 0.1–0.6)
MORPHOLOGY: NORMAL
NEUTROPHIL ABS PRELIM: 7.22 X10 (3) UL (ref 1.3–6.7)
NEUTROPHIL ABSOLUTE MANUAL: 9.18 X10(3) UL (ref 1.3–6.7)
NEUTROPHILS % MANUAL: 80 %
NOROVIRUS 1 BY PCR: NOT DETECTED
NOROVIRUS 2 BY PCR: NOT DETECTED
NRBC CALCULATED: 1
PHOSPHATE SERPL-MCNC: 2.2 MG/DL (ref 2.5–4.9)
PLATELET # BLD AUTO: 158 10(3)UL (ref 150–450)
PLATELET MORPHOLOGY: NORMAL
POTASSIUM SERPL-SCNC: 3.4 MMOL/L (ref 3.6–5.1)
RBC # BLD AUTO: 3.1 X10(6)UL (ref 3.8–5.8)
RED CELL DISTRIBUTION WIDTH-SD: 44.9 FL (ref 35.1–46.3)
SODIUM SERPL-SCNC: 138 MMOL/L (ref 136–144)
TOTAL CELLS COUNTED: 100
WBC # BLD AUTO: 11.2 X10(3) UL (ref 4–13)

## 2018-01-12 PROCEDURE — 99233 SBSQ HOSP IP/OBS HIGH 50: CPT | Performed by: INTERNAL MEDICINE

## 2018-01-12 PROCEDURE — 99232 SBSQ HOSP IP/OBS MODERATE 35: CPT | Performed by: HOSPITALIST

## 2018-01-12 RX ORDER — FUROSEMIDE 10 MG/ML
80 INJECTION INTRAMUSCULAR; INTRAVENOUS ONCE
Status: COMPLETED | OUTPATIENT
Start: 2018-01-12 | End: 2018-01-12

## 2018-01-12 RX ORDER — ALBUMIN (HUMAN) 12.5 G/50ML
25 SOLUTION INTRAVENOUS ONCE
Status: COMPLETED | OUTPATIENT
Start: 2018-01-12 | End: 2018-01-12

## 2018-01-12 NOTE — PROGRESS NOTES
[facility]  Progress Note    Ruth Minor Patient Status:  Inpatient    1940 MRN [id number]   [facility] 4SW-A Attending Kirt Arevalo MD   Hosp Day # 4 PCP None Pcp     Subjective:  Pt without complaints.   Wanted something mor

## 2018-01-12 NOTE — PHYSICAL THERAPY NOTE
PHYSICAL THERAPY EVALUATION - INPATIENT     Room Number: 468/468-A  Evaluation Date: 1/12/2018  Type of Evaluation: Initial  Physician Order: PT Eval and Treat    Presenting Problem: S/p R hernia repair 1/8/18  Reason for Therapy: Mobility Dysfunction Pt reports that he was an independent ambulator and is independent with all ADLs/IADLs. Pt receives assistance with cleaning/laundry at facility. Pt has a drivers license but stopped driving in the past year.      SUBJECTIVE  \"I feel ok\"    Patient self-s on the side of the bed?: None   How much help from another person does the patient currently need. ..   -   Moving to and from a bed to a chair (including a wheelchair)?: A Lot   -   Need to walk in hospital room?: A Little   -   Climbing 3-5 steps with a r training  Posture  Strengthening  Transfer training    Patient End of Session: Up in chair;Needs met;Call light within reach;RN aware of session/findings; All patient questions and concerns addressed; Alarm set    ASSESSMENT   Patient is a 77year old male a Meet Established Goals: 3      CURRENT GOALS    Goal #1 Patient is able to demonstrate supine - sit EOB @ level: modified independent     Goal #2 Patient is able to demonstrate transfers Sit to/from Stand at assistance level: minimum assistance     Goal #3

## 2018-01-12 NOTE — OCCUPATIONAL THERAPY NOTE
OCCUPATIONAL THERAPY EVALUATION - INPATIENT     Room Number: 468/468-A  Evaluation Date: 1/12/2018  Type of Evaluation: Initial  Presenting Problem: Sepsis, incarcerated hernia, SBO s/p bowel resection 1/8/18; GINNY; hypotension, dehydration    Physician Lucy Shine Equipment: Other (Comment) (walker per chart; pt denies owning)    Occupation/Status: retired  Hand Dominance: Right  Drives: No  Patient Regularly Uses: Glasses    Prior Level of Function:  Mod I with basic self-care and functional mobility w/ use of AD ( Inpatient Daily Activity Short Form  How much help from another person does the patient currently need…  -   Putting on and taking off regular lower body clothing?: A Lot  -   Bathing (including washing, rinsing, drying)?: A Lot  -   Toileting, which inclu occupational profile noted above. Functional outcome measures completed include: The AM-GOEMZ ' '6-Clicks' Inpatient Daily Activity Short Form was completed and  this patient  is demonstrating a  56% degree impairment in activities of daily living.  Karan Recommendations: Shower chair;Grab bars    PLAN  OT Treatment Plan: Balance activities; Energy conservation/work simplification techniques;ADL training;UE strengthening/ROM; Functional transfer training; Endurance training;Cognitive reorientation;Patient/Fami

## 2018-01-12 NOTE — PROGRESS NOTES
[facility]  Nephrology Progress Note    Ramírez Connelly Patient Status:  Inpatient    1940 MRN [id number]   [facility] 4SW-A Attending Fabian Goldstein MD   Hosp Day # 4 PCP None Pcp       SUBJECTIVE:  Feels better today, continues 01/12/18   0636   NA  137  138  138   K  4.7  4.0  3.4*   CL  108  105  100*   CO2  23.0  26.0  27.0   BUN  65*  56*  45*   CREATSERUM  4.63*  3.61*  3.07*   CA  8.1*  8.3  9.3   MG  2.5  2.0  1.7   PHOS  3.0  1.7*  2.2*   GLU  107*  109*  96       Recent today    #2. Hypokalemia- replace    #3. Acidosis- due to shock in setting of GINNY. resolved. #4. Septic shock- due to klebsiella related to incarcerated hernia. Remains stable off pressors. Cont abx    #5. Incarcerated inguinal hernia- s/p repair.

## 2018-01-12 NOTE — PROGRESS NOTES
[facility]  Progress Note     Charlie Nikolas Patient Status:  Inpatient    1940 MRN [id number]   [facility] 4SW-A Attending Abel Bailey MD   [facility] Day # 4 PCP None Pcp     Chief Complaint: N/V     S: Patient has some operat times per day   • Heparin Sodium (Porcine)  5,000 Units Subcutaneous [facility]   • famoTIDine  20 mg Intravenous Nightly   • piperacillin-tazobactam  3.375 g Intravenous Q12H       ASSESSMENT / PLAN:     1.  Incarcerated Hernia/Small Bowel Obstruction-POD# 4

## 2018-01-12 NOTE — CM/SW NOTE
CHAYO informed that PT recommended MAN. CHAYO contacted pt's CHRIS Roberts and explain that MAN is recommended. Per POA pt has been to [facility] in the past and would like to return. CHAYO sent referral to [facility] via [facility]; Don screen ordered; qd.

## 2018-01-13 LAB
ALBUMIN SERPL-MCNC: 3 G/DL (ref 3.5–4.8)
ALP LIVER SERPL-CCNC: 61 U/L (ref 45–117)
ALT SERPL-CCNC: 11 U/L (ref 17–63)
AST SERPL-CCNC: 19 U/L (ref 15–41)
BAND %: 19 %
BASOPHIL % MANUAL: 0 %
BASOPHIL ABSOLUTE MANUAL: 0 X10(3) UL (ref 0–0.1)
BILIRUB SERPL-MCNC: 1.6 MG/DL (ref 0.1–2)
BUN BLD-MCNC: 46 MG/DL (ref 8–20)
CALCIUM BLD-MCNC: 9.5 MG/DL (ref 8.3–10.3)
CHLORIDE: 102 MMOL/L (ref 101–111)
CO2: 25 MMOL/L (ref 22–32)
CREAT BLD-MCNC: 2.92 MG/DL (ref 0.7–1.3)
EOSINOPHIL % MANUAL: 2 %
EOSINOPHIL ABSOLUTE MANUAL: 0.27 X10(3) UL (ref 0–0.3)
ERYTHROCYTE [DISTWIDTH] IN BLOOD BY AUTOMATED COUNT: 12.8 % (ref 11.5–16)
GLUCOSE BLD-MCNC: 114 MG/DL (ref 70–99)
HAV IGM SER QL: 1.6 MG/DL (ref 1.7–3)
HCT VFR BLD AUTO: 28.9 % (ref 37–53)
HGB BLD-MCNC: 9.9 G/DL (ref 13–17)
LYMPHOCYTE % MANUAL: 17 %
LYMPHOCYTE ABSOLUTE MANUAL: 2.26 X10(3) UL (ref 0.9–4)
M PROTEIN MFR SERPL ELPH: 7.5 G/DL (ref 6.1–8.3)
MCH RBC QN AUTO: 32 PG (ref 27–33.2)
MCHC RBC AUTO-ENTMCNC: 34.3 G/DL (ref 31–37)
MCV RBC AUTO: 93.5 FL (ref 80–99)
METAMYELOCYTE %: 3 %
METAMYELOCYTE ABSOLUTE MANUAL: 0.4 X10(3) UL (ref ?–0.01)
MONOCYTE % MANUAL: 9 %
MONOCYTE ABSOLUTE MANUAL: 1.2 X10(3) UL (ref 0.1–0.6)
MORPHOLOGY: NORMAL
NEUTROPHIL ABS PRELIM: 7.73 X10 (3) UL (ref 1.3–6.7)
NEUTROPHIL ABSOLUTE MANUAL: 9.18 X10(3) UL (ref 1.3–6.7)
NEUTROPHILS % MANUAL: 50 %
PLATELET # BLD AUTO: 165 10(3)UL (ref 150–450)
PLATELET MORPHOLOGY: NORMAL
POTASSIUM SERPL-SCNC: 3.3 MMOL/L (ref 3.6–5.1)
RBC # BLD AUTO: 3.09 X10(6)UL (ref 3.8–5.8)
RED CELL DISTRIBUTION WIDTH-SD: 43.5 FL (ref 35.1–46.3)
SODIUM SERPL-SCNC: 138 MMOL/L (ref 136–144)
TOTAL CELLS COUNTED: 100
WBC # BLD AUTO: 13.3 X10(3) UL (ref 4–13)

## 2018-01-13 PROCEDURE — 99233 SBSQ HOSP IP/OBS HIGH 50: CPT | Performed by: INTERNAL MEDICINE

## 2018-01-13 PROCEDURE — 99232 SBSQ HOSP IP/OBS MODERATE 35: CPT | Performed by: HOSPITALIST

## 2018-01-13 RX ORDER — CIPROFLOXACIN 250 MG/1
500 TABLET, FILM COATED ORAL 2 TIMES DAILY
Qty: 24 TABLET | Refills: 0 | Status: SHIPPED | OUTPATIENT
Start: 2018-01-13 | End: 2018-01-14

## 2018-01-13 RX ORDER — POTASSIUM CHLORIDE 20 MEQ/1
20 TABLET, EXTENDED RELEASE ORAL ONCE
Status: COMPLETED | OUTPATIENT
Start: 2018-01-13 | End: 2018-01-13

## 2018-01-13 NOTE — PROGRESS NOTES
[facility]  Progress Note    Ami Hidalgo Patient Status:  Inpatient    1940 MRN [id number]   [facility] 3NW-A Attending Jewels Begum MD   Hosp Day # 5 PCP None Pcp     Subjective:  He is tolerating diet. Having BMs.     Jammie Justin

## 2018-01-13 NOTE — PROGRESS NOTES
[facility]  Progress Note     Khurram Veras Patient Status:  Inpatient    1940 MRN [id number]   [facility] 4SW-A Attending Margarita Powers MD   Hosp Day # 5 PCP None Pcp     Chief Complaint: N/V     S: Patient  Feels fine; no famoTIDine  20 mg Intravenous Nightly   • piperacillin-tazobactam  3.375 g Intravenous Q12H       ASSESSMENT / PLAN:     1. Incarcerated Hernia/Small Bowel Obstruction-POD# 5  2.  Septick Shock due to klebsiella pneumoniae septicemia from incarcerated herni

## 2018-01-13 NOTE — PLAN OF CARE
Problem: NEUROLOGICAL - ADULT  Goal: Achieves stable or improved neurological status  INTERVENTIONS  - Assess for and report changes in neurological status  - Initiate measures to prevent increased intracranial pressure  - Maintain blood pressure and fluid care  Interventions:  - Assess ability and encourage patient to participate in ADLs to maximize function  - Promote sitting position while performing ADLs such as feeding, grooming, and bathing  - Educate and encourage patient/family in tolerated functiona

## 2018-01-13 NOTE — PLAN OF CARE
Patient has transfer order to floor. Report given to Abimbola HEREDIA at 0400. At [phone] transferred patient to floor by bed with transporter, followed by RN in a stable condition.

## 2018-01-13 NOTE — PROGRESS NOTES
[facility]  Nephrology Progress Note    Rasheeda Villanueva Attending: Usman Patel MD       Assessment and Plan:    1) GINNY- due to shock (anorexia / vomiting / incarcerated hernia / sepsis)- improving.  Does not appear volume up at this point; has exce BILT 1.6 01/13/2018   TP 7.5 01/13/2018   AST 19 01/13/2018   ALT 11 01/13/2018   MG 1.6 01/13/2018       Imaging: All imaging studies reviewed.     Meds:     Current Facility-Administered Medications:  acetaminophen (TYLENOL) tab 650 mg 650 mg Oral Q6H

## 2018-01-13 NOTE — PROGRESS NOTES
Patient arrived from ICU in stable condition. Patient connected to telemetry. Writing RN introduced self to patient made aware of shift change and that YAN Crawford would be his nurse. Pt verbalize understanding.  Denies any pain or needing anything at this alvarez

## 2018-01-14 LAB
BAND %: 5 %
BASOPHIL % MANUAL: 0 %
BASOPHIL ABSOLUTE MANUAL: 0 X10(3) UL (ref 0–0.1)
BILIRUB UR QL STRIP.AUTO: NEGATIVE
BUN BLD-MCNC: 53 MG/DL (ref 8–20)
CALCIUM BLD-MCNC: 9.4 MG/DL (ref 8.3–10.3)
CHLORIDE: 105 MMOL/L (ref 101–111)
CO2: 25 MMOL/L (ref 22–32)
COLOR UR AUTO: YELLOW
CREAT BLD-MCNC: 3.4 MG/DL (ref 0.7–1.3)
EOSINOPHIL % MANUAL: 3 %
EOSINOPHIL ABSOLUTE MANUAL: 0.46 X10(3) UL (ref 0–0.3)
EOSINOPHILS,URINE: NEGATIVE
ERYTHROCYTE [DISTWIDTH] IN BLOOD BY AUTOMATED COUNT: 12.8 % (ref 11.5–16)
GLUCOSE BLD-MCNC: 122 MG/DL (ref 70–99)
GLUCOSE UR STRIP.AUTO-MCNC: NEGATIVE MG/DL
HAV IGM SER QL: 1.7 MG/DL (ref 1.7–3)
HCT VFR BLD AUTO: 26.6 % (ref 37–53)
HGB BLD-MCNC: 9 G/DL (ref 13–17)
KETONES UR STRIP.AUTO-MCNC: NEGATIVE MG/DL
LEUKOCYTE ESTERASE UR QL STRIP.AUTO: NEGATIVE
LYMPHOCYTE % MANUAL: 13 %
LYMPHOCYTE ABSOLUTE MANUAL: 1.99 X10(3) UL (ref 0.9–4)
MCH RBC QN AUTO: 31.9 PG (ref 27–33.2)
MCHC RBC AUTO-ENTMCNC: 33.8 G/DL (ref 31–37)
MCV RBC AUTO: 94.3 FL (ref 80–99)
MONOCYTE % MANUAL: 12 %
MONOCYTE ABSOLUTE MANUAL: 1.84 X10(3) UL (ref 0.1–0.6)
MORPHOLOGY: NORMAL
MYELOCYTE %: 2 %
MYELOCYTE ABSOLUTE MANUAL: 0.31 X10(3) UL (ref ?–0.01)
NEUTROPHIL ABS PRELIM: 9.1 X10 (3) UL (ref 1.3–6.7)
NEUTROPHIL ABSOLUTE MANUAL: 10.71 X10(3) UL (ref 1.3–6.7)
NEUTROPHILS % MANUAL: 65 %
NITRITE UR QL STRIP.AUTO: NEGATIVE
PH UR STRIP.AUTO: 5 [PH] (ref 4.5–8)
PHOSPHATE SERPL-MCNC: 3.5 MG/DL (ref 2.5–4.9)
PLATELET # BLD AUTO: 192 10(3)UL (ref 150–450)
PLATELET MORPHOLOGY: NORMAL
POTASSIUM SERPL-SCNC: 3.4 MMOL/L (ref 3.6–5.1)
PROT UR STRIP.AUTO-MCNC: 30 MG/DL
RBC # BLD AUTO: 2.82 X10(6)UL (ref 3.8–5.8)
RBC #/AREA URNS AUTO: >10 /HPF
RED CELL DISTRIBUTION WIDTH-SD: 44.2 FL (ref 35.1–46.3)
SODIUM SERPL-SCNC: 140 MMOL/L (ref 136–144)
SODIUM SERPL-SCNC: 47 MMOL/L
SP GR UR STRIP.AUTO: 1.02 (ref 1–1.03)
TOTAL CELLS COUNTED: 100
UROBILINOGEN UR STRIP.AUTO-MCNC: <2 MG/DL
WBC # BLD AUTO: 15.3 X10(3) UL (ref 4–13)

## 2018-01-14 PROCEDURE — 99233 SBSQ HOSP IP/OBS HIGH 50: CPT | Performed by: INTERNAL MEDICINE

## 2018-01-14 PROCEDURE — 99232 SBSQ HOSP IP/OBS MODERATE 35: CPT | Performed by: HOSPITALIST

## 2018-01-14 RX ORDER — POTASSIUM CHLORIDE 20 MEQ/1
20 TABLET, EXTENDED RELEASE ORAL ONCE
Status: COMPLETED | OUTPATIENT
Start: 2018-01-14 | End: 2018-01-14

## 2018-01-14 RX ORDER — SODIUM CHLORIDE 9 MG/ML
INJECTION, SOLUTION INTRAVENOUS CONTINUOUS
Status: DISCONTINUED | OUTPATIENT
Start: 2018-01-14 | End: 2018-01-15

## 2018-01-14 RX ORDER — CIPROFLOXACIN 250 MG/1
250 TABLET, FILM COATED ORAL 2 TIMES DAILY
Qty: 24 TABLET | Refills: 0 | Status: SHIPPED | OUTPATIENT
Start: 2018-01-14 | End: 2018-01-15

## 2018-01-14 NOTE — PLAN OF CARE
CARDIOVASCULAR - ADULT    • Maintains optimal cardiac output and hemodynamic stability Progressing        Impaired Activities of Daily Living    • Achieve highest/safest level of independence in self care Progressing        RESPIRATORY - ADULT    • Achieve

## 2018-01-14 NOTE — PLAN OF CARE
Pt sat up in chair for about an hour this am and then c/o of his bottom hurting. Pt transferred with 2 assist, pt very unsteady and doesn't bear weight well. A/ox3, forgetful.  Pt states he wants to go back to [facility] and states they give him the same

## 2018-01-14 NOTE — PROGRESS NOTES
[facility]  Nephrology Progress Note    Guillermo Banks Attending:   Kitty Swift MD       Assessment and Plan:    1) GINNY- due to shock (anorexia / vomiting / incarcerated hernia / sepsis)- Cr up today likely due to mild volume depletion; also r/o ur 01/14/2018   MG 1.7 01/14/2018   PHOS 3.5 01/14/2018       Imaging: All imaging studies reviewed.     Meds:     Current Facility-Administered Medications:  acetaminophen (TYLENOL) tab 650 mg 650 mg Oral Q6H PRN   HYDROmorphone HCl PF (DILAUDID) 1 MG/ML inj

## 2018-01-14 NOTE — PROGRESS NOTES
[facility]  Progress Note     Walkerron Gabriel Patient Status:  Inpatient    1940 MRN [id number]   [place] 4SW-A Attending Wade Lynn MD   Hosp Day # 6 PCP None Pcp     Chief Complaint: N/V     S: Patient feels fine; no famoTIDine  20 mg Intravenous Nightly   • piperacillin-tazobactam  3.375 g Intravenous Q12H       ASSESSMENT / PLAN:     1. Incarcerated Hernia/Small Bowel Obstruction-POD# 6  2.  Septick Shock due to klebsiella pneumoniae septicemia from incarcerated herni

## 2018-01-14 NOTE — PROGRESS NOTES
[place] applied to red, intact, qian area. Pt tolerated well. Await confirmation of placement to [facility] from [address] [facility].

## 2018-01-14 NOTE — PLAN OF CARE
Assumed care of patient at 1. Isolation maintained. Monitored on tele- NSR/ST.  in place on RA. Seizure precautions in place. IV antibiotics, IV keppra. Pt turn reposition. Fall precautions in place.  Will continue to monitor

## 2018-01-14 NOTE — CM/SW NOTE
MSW spoke with Favian Swartz at [facility] to confirm bed availability today. The patient will dc today at 1pm via [employer] to [facility]. PCS form completed and sent via [employer].  MSW left a message for CHRIS Acosta to inform.     [place] [address]

## 2018-01-14 NOTE — PROGRESS NOTES
[facility]  Progress Note    Rasheeda Villanueva Patient Status:  Inpatient    1940 MRN [id number]   [facility] 3NW-A Attending Patsy Disla MD   Hosp Day # 6 PCP None Pcp     Subjective:  Seen eating breakfast. Feeling good, denies rehab at [facility] where he resides however his discharge was delayed today for elevated creatinine. IV fluids will be restarted. Further plan and management per nephrology service, Dr. Paula Arias  Doing well from surgical standpoint.   Okay to discharge esperanza

## 2018-01-15 VITALS
WEIGHT: 164.25 LBS | BODY MASS INDEX: 24.89 KG/M2 | RESPIRATION RATE: 16 BRPM | HEART RATE: 82 BPM | DIASTOLIC BLOOD PRESSURE: 74 MMHG | TEMPERATURE: 98 F | SYSTOLIC BLOOD PRESSURE: 118 MMHG | OXYGEN SATURATION: 100 % | HEIGHT: 68 IN

## 2018-01-15 LAB
BASOPHIL % MANUAL: 0 %
BASOPHIL ABSOLUTE MANUAL: 0 X10(3) UL (ref 0–0.1)
BUN BLD-MCNC: 42 MG/DL (ref 8–20)
CALCIUM BLD-MCNC: 9 MG/DL (ref 8.3–10.3)
CHLORIDE: 109 MMOL/L (ref 101–111)
CO2: 23 MMOL/L (ref 22–32)
CREAT BLD-MCNC: 3.26 MG/DL (ref 0.7–1.3)
EOSINOPHIL % MANUAL: 1 %
EOSINOPHIL ABSOLUTE MANUAL: 0.15 X10(3) UL (ref 0–0.3)
ERYTHROCYTE [DISTWIDTH] IN BLOOD BY AUTOMATED COUNT: 12.7 % (ref 11.5–16)
GLUCOSE BLD-MCNC: 102 MG/DL (ref 70–99)
GLUCOSE BLD-MCNC: 113 MG/DL (ref 65–99)
HCT VFR BLD AUTO: 26.6 % (ref 37–53)
HGB BLD-MCNC: 8.8 G/DL (ref 13–17)
LYMPHOCYTE % MANUAL: 7 %
LYMPHOCYTE ABSOLUTE MANUAL: 1.07 X10(3) UL (ref 0.9–4)
MCH RBC QN AUTO: 31.8 PG (ref 27–33.2)
MCHC RBC AUTO-ENTMCNC: 33.1 G/DL (ref 31–37)
MCV RBC AUTO: 96 FL (ref 80–99)
METAMYELOCYTE %: 3 %
METAMYELOCYTE ABSOLUTE MANUAL: 0.46 X10(3) UL (ref ?–0.01)
MONOCYTE % MANUAL: 11 %
MONOCYTE ABSOLUTE MANUAL: 1.68 X10(3) UL (ref 0.1–0.6)
MORPHOLOGY: NORMAL
NEUTROPHIL ABS PRELIM: 9.54 X10 (3) UL (ref 1.3–6.7)
NEUTROPHIL ABSOLUTE MANUAL: 11.93 X10(3) UL (ref 1.3–6.7)
NEUTROPHILS % MANUAL: 78 %
PLATELET # BLD AUTO: 201 10(3)UL (ref 150–450)
PLATELET MORPHOLOGY: NORMAL
POTASSIUM SERPL-SCNC: 3.9 MMOL/L (ref 3.6–5.1)
RBC # BLD AUTO: 2.77 X10(6)UL (ref 3.8–5.8)
RED CELL DISTRIBUTION WIDTH-SD: 44.3 FL (ref 35.1–46.3)
SODIUM SERPL-SCNC: 140 MMOL/L (ref 136–144)
TOTAL CELLS COUNTED: 100
WBC # BLD AUTO: 15.3 X10(3) UL (ref 4–13)

## 2018-01-15 PROCEDURE — 99239 HOSP IP/OBS DSCHRG MGMT >30: CPT | Performed by: HOSPITALIST

## 2018-01-15 PROCEDURE — 99232 SBSQ HOSP IP/OBS MODERATE 35: CPT | Performed by: INTERNAL MEDICINE

## 2018-01-15 NOTE — PROGRESS NOTES
[facility]  Nephrology Progress Note    Sameera Harrison Patient Status:  Outpatient in a Bed    1940 MRN [id number]   [facility] 3NW-A Attending Dougie Pascual MD   Hosp Day # 7 PCP None Pcp       SUBJECTIVE:  No overnight events, 1062  01/15/18   0626   NA  138  140  140   K  3.3*  3.4*  3.9   CL  102  105  109   CO2  25.0  25.0  23.0   BUN  46*  53*  42*   CREATSERUM  2.92*  3.40*  3.26*   CA  9.5  9.4  9.0   MG  1.6*  1.7   --    PHOS   --   3.5   --    GLU  114*  122*  102* recovery. #2. Incarcerated R inguinal hernia- s/p repair. Tolerating diet    #3. Klebsiella sepsis- BP stable.   On iván Alegria  1/15/2018  8:46 AM

## 2018-01-15 NOTE — CM/SW NOTE
Dtr will pick patient up after 6pm, RN is aware. Rn given # to call report at [facility]. MSW sent d/c instructions/PT and OT and CDIFF note via fax to [facility].     Home health arranged with [facility] at [address]

## 2018-01-15 NOTE — PROGRESS NOTES
[facility]  Progress Note    Ambar West Patient Status:  Inpatient    1940 MRN [id number]   [facility] 3NW-A Attending Osman Reyes MD   Hosp Day # 7 PCP None Pcp     Subjective:  Patient seen sleeping, awakens easily.  Patie

## 2018-01-15 NOTE — PHYSICAL THERAPY NOTE
PHYSICAL THERAPY TREATMENT NOTE - INPATIENT    Room Number: 395/435-C     Session: 1   Number of Visits to Meet Established Goals: 3    Presenting Problem: S/p R hernia repair 1/8/18     History related to current admission: Pt is 77year old male admitte Static Sitting: Fair +  Dynamic Sitting: Fair +           Static Standing: Fair -  Dynamic Standing: Fair -    ACTIVITY TOLERANCE  O2 Saturation: 93%  Room air    AM Ot paged to see pt. Patient End of Session: Up in chair;Needs met;Call light within reach;RN aware of session/findings; All patient questions and concerns addressed    ASSESSMENT   Pt seen for gait training, active flexibility and BLE strengthening,

## 2018-01-15 NOTE — CM/SW NOTE
MSW made 2nd call to nir boyce, Kris Galindo was not able to tell me if patient is current with [facility]. MSW advised him that patient is expected to d/c today. Per [employer] may want to see patient before d/c.      Marylene Bad called MSW back, states to us

## 2018-01-15 NOTE — PROGRESS NOTES
Ok to d/c if ok with specialists.     Iglesia Jacinto MD  [facility]  Internal Medicine Hospitalist  Pager [phone]

## 2018-01-15 NOTE — CM/SW NOTE
MSW spoke to patient, he wants to go back to [facility] [phone]. Fax: [phone]. Assisted Living with home health. He is agreeable to [facility], he knows it's private pay. With patient's permission MSW left message with dtr to discuss d/c.

## 2018-01-15 NOTE — OCCUPATIONAL THERAPY NOTE
OCCUPATIONAL THERAPY TREATMENT NOTE - INPATIENT     Room Number: 102/056-K  Session: 1   Number of Visits to Meet Established Goals: 7    Presenting Problem: Sepsis, incarcerated hernia, SBO s/p bowel resection 1/8/18; GINNY; hypotension, dehydration    Hist promotion;Repositioning     ACTIVITY TOLERANCE  Room air    ACTIVITIES OF DAILY LIVING ASSESSMENT  AM-PAC ‘6-Clicks’ Inpatient Daily Activity Short Form  How much help from another person does the patient currently need…  -   Putting on and taking off regu health PT/OT  OT Device Recommendations: Shower chair;Grab bars    PLAN  OT Treatment Plan: Balance activities; Energy conservation/work simplification techniques;ADL training;UE strengthening/ROM; Functional transfer training; Endurance training;Cognitive re

## 2018-01-16 NOTE — CM/SW NOTE
01/16/18 0800   Discharge disposition   Discharged to: Home-Health   Name of Jhonny at [facility] transportation Private car

## 2018-01-16 NOTE — DISCHARGE SUMMARY
[facility]  DISCHARGE SUMMARY     Isaías Duke Patient Status:  Outpatient in a Bed    1940 MRN [id number]   [facility] 3NW-A Attending No att. providers found   Hosp Day # 7 PCP None Pcp     Date of Admission: 2018  Date incarcerated hernia, which was surgically repaired. Found to have positive blood culture with klebsiella, which cleared with iv abx during hospital course. Completed abx course during stay. GINNY as well improved over time with hydration and abx.       Pro Follow-up appointment:   Edwige Ferguson MD  [address] [facility] [place] [address] Kate Hornerbaljinder Bernard [phone]    Schedule an appointment as soon as possible for a visit in 1 week  You may also see one of the PAs (Shannan Temple or July) in follow u

## 2018-01-16 NOTE — PLAN OF CARE
CARDIOVASCULAR - ADULT    • Maintains optimal cardiac output and hemodynamic stability Adequate for Discharge        Impaired Activities of Daily Living    • Achieve highest/safest level of independence in self care Adequate for Discharge        Impaired F

## 2022-01-27 NOTE — OCCUPATIONAL THERAPY NOTE
Having some post op issues, Interstim placed on Wednesday, wishes to speak with a nurse for advice.    OCCUPATIONAL THERAPY TREATMENT NOTE - INPATIENT     Room Number: 417/417-A  Session: 1/7  Number of Visits to Meet Established Goals: 7         History related to current admission:   Admitted with C-diff , weight loss.  PMH: dementia, major neurocognitive Modifier (G-Code): CK    FUNCTIONAL TRANSFER ASSESSMENT  Supine to Sit : Supervision  Sit to Stand: Minimum assistance    Skilled Therapy Provided:   OT/PT facilitated patient to EOB and educated in UE therex with therapist demo and repeated cues needed fo

## 2025-05-15 NOTE — PROGRESS NOTES
Pulmonary Progress Note      NAME: Ameya Nava - ROOM: [address] [place] [address] - MRN: [id number] - Age: 77year old - : 1940    Assessment/Plan:  1. Septic shock: Resolved.  secondary to incarcerated hernia / intraabdominal process  -off pressors.   -random jessica S/w pt and conveyed Dr Franklin's message.   Order for MMR titers placed, await results.   respiratory distress. HEENT: Normocephalic atraumatic. Lips, mucosa, and tongue normal.  No thrush noted. Lungs: Clear to auscultation bilaterally, no focal wheezes or crackles    Chest wall: No tenderness or deformity.    Heart: Regular rate and rhythm,

## (undated) DEVICE — FILTERLINE NASAL ADULT O2/CO2

## (undated) DEVICE — TROCAR: Brand: KII® SLEEVE

## (undated) DEVICE — SOL  .9 1000ML BTL

## (undated) DEVICE — MEDI-VAC NON-CONDUCTIVE SUCTION TUBING: Brand: CARDINAL HEALTH

## (undated) DEVICE — #10 STERILE BLADE: Brand: POLYMER COATED BLADES

## (undated) DEVICE — GENERAL LAPAROS CDS-LF: Brand: MEDLINE INDUSTRIES, INC.

## (undated) DEVICE — 1200CC GUARDIAN II: Brand: GUARDIAN

## (undated) DEVICE — ENDOSCOPY PACK UPPER: Brand: MEDLINE INDUSTRIES, INC.

## (undated) DEVICE — KENDALL SCD EXPRESS SLEEVES, KNEE LENGTH, MEDIUM: Brand: KENDALL SCD

## (undated) DEVICE — TROCAR: Brand: KII FIOS FIRST ENTRY

## (undated) DEVICE — ENDOPATH ULTRA VERESS INSUFFLATION NEEDLES WITH LUER LOCK CONNECTORS: Brand: ENDOPATH

## (undated) DEVICE — "MH-438 A/W VLVE F/140 EVIS-140": Brand: AIR/WATER VALVE

## (undated) DEVICE — Device

## (undated) DEVICE — SUTURE VICRYL 5-0 PC-1

## (undated) DEVICE — GLOVE BIOGEL M SURG SZ 71/2

## (undated) DEVICE — BULB SYRINGE,IRRIGATION WITH PROTECTIVE CAP: Brand: DOVER

## (undated) DEVICE — PAD SACRAL SPAN AID

## (undated) DEVICE — VIOLET BRAIDED (POLYGLACTIN 910), SYNTHETIC ABSORBABLE SUTURE: Brand: COATED VICRYL

## (undated) DEVICE — POOLE SUCTION HANDLE: Brand: CARDINAL HEALTH

## (undated) DEVICE — CAPSURE PERMANENT FIXATION SYSTEM 30 PERMANENT FASTENERS: Brand: CAPSURE PERMANENT FIXATION SYSTEM

## (undated) DEVICE — ENDOSCOPY PACK - LOWER: Brand: MEDLINE INDUSTRIES, INC.

## (undated) DEVICE — FORCEP BIOPSY RJ4 LG CAP W/ND

## (undated) DEVICE — MONOFILAMENT ABSORBABLE SUTURE: Brand: MAXON

## (undated) DEVICE — SPONGE STICK WITH PVP-I: Brand: KENDALL

## (undated) DEVICE — TROCAR: Brand: KII SHIELDED BLADED ACCESS SYSTEM

## (undated) DEVICE — Device: Brand: DEFENDO AIR/WATER/SUCTION AND BIOPSY VALVE

## (undated) DEVICE — CAUTERY PENCIL

## (undated) NOTE — IP AVS SNAPSHOT
Patient Demographics     Address Phone    [address] [phone] [facility])      Emergency Contact(s)     Name Relation Home Work [address] Erma Diaz [facility] [phone] Commonly known as:  CeleXA   Next dose due:  4/16 am          Take 1.5 tablets (15 mg total) by mouth daily.     Rolando Polanco                           ferrous sulfate 325 (65 FE) MG [facility]   Last time this was given:  325 mg on 4/15/2017  8:54 AM   Next dos [id number] Acidophilus/Pectin (PROBIOTIC) CAPS 1 capsule 04/14/17 2025 Given      [id number] Acidophilus/Pectin (PROBIOTIC) CAPS 1 capsule 04/15/17 0854 Given      [id number] Thiamine HCl tab 100 mg 04/15/17 0854 Given      [id number] aspirin EC tab 81 mg 04/ Lab Results Last 24 Hours (04/15/17 - 04/15/17)      BASIC METABOLIC PANEL (8) [[id number]] (Abnormal)  Resulted: 04/15/17 0650, Result status: Final result    Ordering provider: Iza Leong MD  04/14/17 2300 Resulting lab: [facility]    Specimen Inform Specimen Information:  Other from Nares      Mrsa Culture No MRSA Isolated     Narrative:      Note: This culture is a screen for Methicillin Resistant Staphylococcus aureus (MRSA)only and does not detect Methicillin Sensitive Staphylococcus aureus(MSSA). MEDICATIONS:  Aspirin, psyllium, Uloric, metronidazole, probiotic, citalopram, Keppra, magnesium, niacin, thiamine. FAMILY HISTORY:  Hypertension in father. Mother had heart disease. SOCIAL HISTORY:  Prior history of EtOH.   No current smoking, alcoh Author:  Ele Peguero MD Service:  (none) Author Type:  Physician    Filed:  4/12/2017  7:05 PM Note Time:  4/11/2017  8:52 PM Status:  Signed    :  Ele Peguero MD (Physician)       Consult Orders:    1.  IP consult to Urology Once [[id number]] ord Acidophilus/Pectin Oral Cap Take 1 capsule by mouth 2 (two) times daily. Disp:  Rfl:    Citalopram Hydrobromide 10 MG Oral Tab Take 1.5 tablets (15 mg total) by mouth daily.  Disp: 45 tablet Rfl: 0   levETIRAcetam 500 MG Oral Tab Take 500 mg by mouth 2 (two Years of Education: N/A  Number of Children: N/A     Occupational History  None on file     Social History Main Topics   Smoking status: Current Some Day Smoker     Types: Cigars    Smokeless tobacco: Not on file    Comment: twice a month    Alcohol Use: through 4/15/2017  4:15 PM)      Physical Therapy Note by Bhavna Cleaning PT at 4/14/2017  1:28 PM  Version 1 of 1    Author:  Bhavna Cleaning PT Service:  (none) Author Type:  Physical Therapist    Filed:  4/14/2017  1:31 PM Note Time:  4/14/2017  1:28 -   Turning over in bed (including adjusting bedclothes, sheets and blankets)?: A Little   -   Sitting down on and standing up from a chair with arms (e.g., wheelchair, bedside commode, etc.): A Little   -   Moving from lying on back to sitting on the side rehab is recommended for 12-14 days.  After this period of rehabilitation patient should achieve mod I level in ambulation and transfers for safe return to assisted living facility where pt is alone in apartment and was previously ind with ambulation mario Problem List  Principal Problem:    Acute renal failure, unspecified acute renal failure type (HCC)  Active Problems:    Hyperglycemia    Leukocytosis    Acute renal failure (ARF) (HCC)    Acute kidney injury ([facility] [address]    Metabolic acidosis    Acute renal fail Pt performed supine>sit EOB with supervision assistance. Pt performed sit>stand with RW and min assistance with min verbal/visual/tactile cues for safety with RW use and hand placement.  Pt performed functional mobility with min assistance and RW x approx 2 OT Discharge Recommendations: Sub-acute rehabilitation (elos 12-14 days)  OT Device Recommendations: TBD    PLAN  OT Treatment Plan: Balance activities; Energy conservation/work simplification techniques;ADL training;Functional transfer training;UE strength Acute renal failure (HCC)    Dehydration    Intestinal infection due to Clostridium difficile    Hypotension, unspecified hypotension type    Diarrhea, unspecified type    At risk for falling    Diarrhea    CKD (chronic kidney disease)    Hypokalemia Pt required max A for donning new clean socks. Pt required total A for qian-care and depends management.  Pt performed functional mobility with RW and with min assist. Pt performed standing>sit in his chair with min assist and min cues for RW use and hand strengthening/ROM; Endurance training;Patient/Family education;Patient/Family training;Equipment eval/education; Compensatory technique education  Rehab Potential : Fair  Frequency (Obs): 5x/week           OT Goals:   ADL Goals  Patient will perform grooming

## (undated) NOTE — LETTER
[facility]  Galaguila Hernandezlily 61 [address] [facility], [address] [place] [address]    Consent for Operation    Date: __________________    Time: _______________    1.  I authorize the performance upon Charlie Ang the following operation:    Procedure(s):  RIGHT LAPAROSCOPIC IN procedure has been videotaped, the surgeon will obtain the original videotape. The hospital will not be responsible for storage or maintenance of this tape.     6. For the purpose of advancing medical education, I consent to the admittance of observers to t STATEMENTS REQUIRING INSERTION OR COMPLETION WERE FILLED IN.     Signature of Patient:   ___________________________    When the patient is a minor or mentally incompetent to give consent:  Signature of person authorized to consent for patient: ____________ drugs/illegal medications). Failure to inform my anesthesiologist about these medicines may increase my risk of anesthetic complications. · If I am allergic to anything or have had a reaction to anesthesia before.     3. I understand how the anesthesia med I have discussed the procedure and information above with the patient (or patient’s representative) and answered their questions. The patient or their representative has agreed to have anesthesia services.     _______________________________________________

## (undated) NOTE — IP AVS SNAPSHOT
[facility] [place] [address] [place], [address] ~ [phone]                Discharge Summary   4/5/2017    Ronda Player           Admission Information        Provider Department    4/5/2017 Oli Robbins MD  4nw-A         Deena Resendez Take 81 mg by mouth daily. CERTAVITE SENIOR/ANTIOXIDANT Tabs   Last time this was given:  1 tablet on 4/15/2017  8:54 AM   Next dose due:  4/16 am          Take 1 tablet by mouth daily.                             cholecal Bring a paper prescription for each of these medications    - Vancomycin HCl 125 MG Caps              Patient Instructions       You were given a referral to See a Neurologist Dr. Hernando Hernandez or Dr. Memo Santoyo at [facility].  Lists of hospitals in the [place] call [phone] to arrange appointme Metabolic Lab Results  (Last result in the past 90 days)    ALT Bilirubin,Total Total Protein Albumin Sodium Potassium Chloride    (04/13/17)  13 (L) (04/13/17)  0.4 (04/13/17)  5.6 (L) (04/13/17)  2.2 (L) (04/15/17)  142 (04/15/17)  4.5 (04/15/17)  114 (H _____________________________________________________________________________    Medication Side Effects - Medications to be taken at home  As your caregivers, we want you to be aware of the medications you are prescribed to take and their potential SIDE E Use:  Nausea/vomiting, acid reflux, low bowel motility, stomach pain   Most common side effects:  Depends on the specific medication but generally include: diarrhea, constipation, headache, allergic reaction (itching, rash, hives)   What to report to your

## (undated) NOTE — LETTER
[facility]  Burak Manzano 60 [address]    Consent for Operation    Date: __________________    Time: _______________    1.  I authorize the performance upon Rasheeda Villanueva the following operation:    Procedure(s):  ESOPHAGOGASTRODUODENO procedure has been videotaped, the surgeon will obtain the original videotape. The hospital will not be responsible for storage or maintenance of this tape.     6. For the purpose of advancing medical education, I consent to the admittance of observers to t STATEMENTS REQUIRING INSERTION OR COMPLETION WERE FILLED IN.     Signature of Patient:   ___________________________    When the patient is a minor or mentally incompetent to give consent:  Signature of person authorized to consent for patient: ____________ drugs/illegal medications). Failure to inform my anesthesiologist about these medicines may increase my risk of anesthetic complications. · If I am allergic to anything or have had a reaction to anesthesia before.     3. I understand how the anesthesia med I have discussed the procedure and information above with the patient (or patient’s representative) and answered their questions. The patient or their representative has agreed to have anesthesia services.     _______________________________________________